# Patient Record
Sex: FEMALE | Race: WHITE
[De-identification: names, ages, dates, MRNs, and addresses within clinical notes are randomized per-mention and may not be internally consistent; named-entity substitution may affect disease eponyms.]

---

## 2017-03-06 ENCOUNTER — CHARTING TRANS (OUTPATIENT)
Dept: OTHER | Age: 27
End: 2017-03-06

## 2018-11-05 VITALS
HEIGHT: 65 IN | RESPIRATION RATE: 16 BRPM | WEIGHT: 170 LBS | HEART RATE: 66 BPM | DIASTOLIC BLOOD PRESSURE: 70 MMHG | OXYGEN SATURATION: 97 % | TEMPERATURE: 98.1 F | SYSTOLIC BLOOD PRESSURE: 120 MMHG | BODY MASS INDEX: 28.32 KG/M2

## 2018-12-18 ENCOUNTER — WALK IN (OUTPATIENT)
Dept: URGENT CARE | Age: 28
End: 2018-12-18

## 2018-12-18 VITALS
HEART RATE: 76 BPM | RESPIRATION RATE: 16 BRPM | WEIGHT: 165 LBS | TEMPERATURE: 98.5 F | DIASTOLIC BLOOD PRESSURE: 70 MMHG | SYSTOLIC BLOOD PRESSURE: 110 MMHG | HEIGHT: 65 IN | BODY MASS INDEX: 27.49 KG/M2 | OXYGEN SATURATION: 97 %

## 2018-12-18 DIAGNOSIS — B96.89 ACUTE BACTERIAL SINUSITIS: Primary | ICD-10-CM

## 2018-12-18 DIAGNOSIS — J01.90 ACUTE BACTERIAL SINUSITIS: Primary | ICD-10-CM

## 2018-12-18 PROBLEM — J06.9 VIRAL UPPER RESPIRATORY TRACT INFECTION: Status: ACTIVE | Noted: 2018-12-18

## 2018-12-18 PROCEDURE — 99214 OFFICE O/P EST MOD 30 MIN: CPT | Performed by: NURSE PRACTITIONER

## 2018-12-18 RX ORDER — BENZONATATE 100 MG/1
100 CAPSULE ORAL 3 TIMES DAILY PRN
Qty: 21 CAPSULE | Refills: 0 | Status: SHIPPED | OUTPATIENT
Start: 2018-12-18 | End: 2018-12-25

## 2018-12-18 RX ORDER — AMOXICILLIN AND CLAVULANATE POTASSIUM 875; 125 MG/1; MG/1
1 TABLET, FILM COATED ORAL 2 TIMES DAILY
Qty: 20 TABLET | Refills: 0 | Status: SHIPPED | OUTPATIENT
Start: 2018-12-18 | End: 2018-12-28

## 2018-12-18 ASSESSMENT — ENCOUNTER SYMPTOMS
SWOLLEN GLANDS: 0
ABDOMINAL PAIN: 0
HEADACHES: 0
HEARTBURN: 0
ANOREXIA: 0
VOMITING: 0
CHANGE IN BOWEL HABIT: 0
CHILLS: 1
SORE THROAT: 0
SHORTNESS OF BREATH: 0
HEMOPTYSIS: 0
VISUAL CHANGE: 0
RHINORRHEA: 1
NAUSEA: 0
WHEEZING: 0
SWEATS: 0
WEAKNESS: 0
NUMBNESS: 0
COUGH: 1
DIAPHORESIS: 0
FATIGUE: 1
FEVER: 0
VERTIGO: 0
WEIGHT LOSS: 0

## 2020-08-27 ENCOUNTER — OFFICE VISIT (OUTPATIENT)
Dept: FAMILY MEDICINE CLINIC | Facility: CLINIC | Age: 30
End: 2020-08-27
Payer: COMMERCIAL

## 2020-08-27 VITALS
DIASTOLIC BLOOD PRESSURE: 80 MMHG | TEMPERATURE: 98 F | HEART RATE: 77 BPM | HEIGHT: 66 IN | WEIGHT: 196 LBS | SYSTOLIC BLOOD PRESSURE: 118 MMHG | BODY MASS INDEX: 31.5 KG/M2

## 2020-08-27 DIAGNOSIS — N92.6 IRREGULAR PERIODS: ICD-10-CM

## 2020-08-27 DIAGNOSIS — L68.0 HIRSUTISM: ICD-10-CM

## 2020-08-27 DIAGNOSIS — H61.23 BILATERAL IMPACTED CERUMEN: ICD-10-CM

## 2020-08-27 DIAGNOSIS — E66.9 OBESITY (BMI 30.0-34.9): ICD-10-CM

## 2020-08-27 DIAGNOSIS — Z00.00 WELL ADULT EXAM: Primary | ICD-10-CM

## 2020-08-27 PROCEDURE — 69210 REMOVE IMPACTED EAR WAX UNI: CPT | Performed by: STUDENT IN AN ORGANIZED HEALTH CARE EDUCATION/TRAINING PROGRAM

## 2020-08-27 PROCEDURE — 3074F SYST BP LT 130 MM HG: CPT | Performed by: STUDENT IN AN ORGANIZED HEALTH CARE EDUCATION/TRAINING PROGRAM

## 2020-08-27 PROCEDURE — 99385 PREV VISIT NEW AGE 18-39: CPT | Performed by: STUDENT IN AN ORGANIZED HEALTH CARE EDUCATION/TRAINING PROGRAM

## 2020-08-27 PROCEDURE — 3079F DIAST BP 80-89 MM HG: CPT | Performed by: STUDENT IN AN ORGANIZED HEALTH CARE EDUCATION/TRAINING PROGRAM

## 2020-08-27 PROCEDURE — 3008F BODY MASS INDEX DOCD: CPT | Performed by: STUDENT IN AN ORGANIZED HEALTH CARE EDUCATION/TRAINING PROGRAM

## 2020-08-27 NOTE — PROGRESS NOTES
HPI:    Patient ID: Mely Kent is a 30year old female. HPI  Pt presenting for routine well woman exam. Denies any acute issues or recent illnesses. No significant chronic medical problems.  Past medical/surgical history, family history, and social h membrane, ear canal and external ear normal. There is impacted cerumen. Eyes:      Extraocular Movements: Extraocular movements intact. Conjunctiva/sclera: Conjunctivae normal.      Pupils: Pupils are equal, round, and reactive to light.    Neck: to increase fiber and water intake as tolerated  - HEMOGLOBIN A1C; Future  - LIPID PANEL; Future    3.  Irregular periods  Clinical symptoms suggestive of PCOS  - will check labs below  - to see Gyne for continued management   - CBC, PLATELET; NO DIFFERENTI

## 2020-08-28 ENCOUNTER — LAB ENCOUNTER (OUTPATIENT)
Dept: LAB | Age: 30
End: 2020-08-28
Attending: STUDENT IN AN ORGANIZED HEALTH CARE EDUCATION/TRAINING PROGRAM
Payer: COMMERCIAL

## 2020-08-28 DIAGNOSIS — Z00.00 WELL ADULT EXAM: ICD-10-CM

## 2020-08-28 DIAGNOSIS — E66.9 OBESITY (BMI 30.0-34.9): ICD-10-CM

## 2020-08-28 DIAGNOSIS — N92.6 IRREGULAR PERIODS: ICD-10-CM

## 2020-08-28 LAB
ALBUMIN SERPL-MCNC: 3.7 G/DL (ref 3.4–5)
ALBUMIN/GLOB SERPL: 1.2 {RATIO} (ref 1–2)
ALP LIVER SERPL-CCNC: 53 U/L (ref 37–98)
ALT SERPL-CCNC: 20 U/L (ref 13–56)
ANION GAP SERPL CALC-SCNC: 5 MMOL/L (ref 0–18)
AST SERPL-CCNC: 15 U/L (ref 15–37)
BILIRUB SERPL-MCNC: 0.6 MG/DL (ref 0.1–2)
BUN BLD-MCNC: 10 MG/DL (ref 7–18)
BUN/CREAT SERPL: 11.2 (ref 10–20)
CALCIUM BLD-MCNC: 9.2 MG/DL (ref 8.5–10.1)
CHLORIDE SERPL-SCNC: 107 MMOL/L (ref 98–112)
CHOLEST SMN-MCNC: 212 MG/DL (ref ?–200)
CO2 SERPL-SCNC: 27 MMOL/L (ref 21–32)
CREAT BLD-MCNC: 0.89 MG/DL (ref 0.55–1.02)
DEPRECATED RDW RBC AUTO: 38 FL (ref 35.1–46.3)
ERYTHROCYTE [DISTWIDTH] IN BLOOD BY AUTOMATED COUNT: 12.4 % (ref 11–15)
EST. AVERAGE GLUCOSE BLD GHB EST-MCNC: 108 MG/DL (ref 68–126)
GLOBULIN PLAS-MCNC: 3.2 G/DL (ref 2.8–4.4)
GLUCOSE BLD-MCNC: 86 MG/DL (ref 70–99)
HBA1C MFR BLD HPLC: 5.4 % (ref ?–5.7)
HCT VFR BLD AUTO: 42.1 % (ref 35–48)
HDLC SERPL-MCNC: 82 MG/DL (ref 40–59)
HGB BLD-MCNC: 13.9 G/DL (ref 12–16)
LDLC SERPL CALC-MCNC: 111 MG/DL (ref ?–100)
M PROTEIN MFR SERPL ELPH: 6.9 G/DL (ref 6.4–8.2)
MCH RBC QN AUTO: 28 PG (ref 26–34)
MCHC RBC AUTO-ENTMCNC: 33 G/DL (ref 31–37)
MCV RBC AUTO: 84.7 FL (ref 80–100)
NONHDLC SERPL-MCNC: 130 MG/DL (ref ?–130)
OSMOLALITY SERPL CALC.SUM OF ELEC: 286 MOSM/KG (ref 275–295)
PATIENT FASTING Y/N/NP: YES
PATIENT FASTING Y/N/NP: YES
PLATELET # BLD AUTO: 266 10(3)UL (ref 150–450)
POTASSIUM SERPL-SCNC: 3.9 MMOL/L (ref 3.5–5.1)
RBC # BLD AUTO: 4.97 X10(6)UL (ref 3.8–5.3)
SODIUM SERPL-SCNC: 139 MMOL/L (ref 136–145)
TRIGL SERPL-MCNC: 96 MG/DL (ref 30–149)
TSI SER-ACNC: 1.08 MIU/ML (ref 0.36–3.74)
VLDLC SERPL CALC-MCNC: 19 MG/DL (ref 0–30)
WBC # BLD AUTO: 6.4 X10(3) UL (ref 4–11)

## 2020-08-28 PROCEDURE — 84443 ASSAY THYROID STIM HORMONE: CPT

## 2020-08-28 PROCEDURE — 83036 HEMOGLOBIN GLYCOSYLATED A1C: CPT

## 2020-08-28 PROCEDURE — 80061 LIPID PANEL: CPT

## 2020-08-28 PROCEDURE — 36415 COLL VENOUS BLD VENIPUNCTURE: CPT

## 2020-08-28 PROCEDURE — 80053 COMPREHEN METABOLIC PANEL: CPT

## 2020-08-28 PROCEDURE — 85027 COMPLETE CBC AUTOMATED: CPT

## 2020-08-31 ENCOUNTER — NURSE TRIAGE (OUTPATIENT)
Dept: FAMILY MEDICINE CLINIC | Facility: CLINIC | Age: 30
End: 2020-08-31

## 2020-08-31 NOTE — TELEPHONE ENCOUNTER
Action Requested: Summary for Provider     []  Critical Lab, Recommendations Needed  [] Need Additional Advice  []   FYI    []   Need Orders  [] Need Medications Sent to Pharmacy  []  Other     SUMMARY: exposed to person who tested positive for COVID-19.

## 2020-09-17 ENCOUNTER — TELEPHONE (OUTPATIENT)
Dept: FAMILY MEDICINE CLINIC | Facility: CLINIC | Age: 30
End: 2020-09-17

## 2020-09-17 DIAGNOSIS — R10.2 PELVIC PAIN: Primary | ICD-10-CM

## 2020-09-17 NOTE — TELEPHONE ENCOUNTER
Patient is calling stating she was supposed to have an ultra sound for her Ovaries. There is not one in the system. Can we please get that ordered? Please Advise.

## 2020-09-17 NOTE — TELEPHONE ENCOUNTER
Please call pt to let her know pelvic ultrasound was ordered. She must call [phone] to schedule. She should also follow-up with Gyne as previously discussed; referral is in chart.

## 2020-09-17 NOTE — TELEPHONE ENCOUNTER
Pelvic ultrasound ordered.  If normal, she will need to follow-up with Gyne (referral placed during last OV)

## 2020-09-17 NOTE — TELEPHONE ENCOUNTER
Does she need to see the gynecologist or will you order the ultrasound of her ovaries as she is requesting

## 2020-10-05 ENCOUNTER — HOSPITAL ENCOUNTER (OUTPATIENT)
Age: 30
Discharge: HOME | End: 2020-10-05
Payer: COMMERCIAL

## 2020-10-05 VITALS
HEIGHT: 65 IN | SYSTOLIC BLOOD PRESSURE: 108 MMHG | WEIGHT: 190 LBS | BODY MASS INDEX: 31.65 KG/M2 | TEMPERATURE: 98 F | OXYGEN SATURATION: 99 % | HEART RATE: 66 BPM | DIASTOLIC BLOOD PRESSURE: 70 MMHG | RESPIRATION RATE: 20 BRPM

## 2020-10-05 DIAGNOSIS — L03.019 ONYCHIA AND PARONYCHIA OF FINGER: Primary | ICD-10-CM

## 2020-10-05 PROCEDURE — 99202 OFFICE O/P NEW SF 15 MIN: CPT | Performed by: NURSE PRACTITIONER

## 2020-10-05 RX ORDER — CEPHALEXIN 500 MG/1
500 CAPSULE ORAL 2 TIMES DAILY
Qty: 14 CAPSULE | Refills: 0 | Status: SHIPPED | OUTPATIENT
Start: 2020-10-05 | End: 2020-10-12

## 2020-10-05 NOTE — ED PROVIDER NOTES
Patient Seen in: [address]      History   Patient presents with:  Finger Injury: Left first digit    Stated Complaint: FINGER INJURY    HPI    This is a well-appearing female who presents with a chief complaint of left Mouth/Throat:      Mouth: Mucous membranes are dry. Eyes:      Extraocular Movements: Extraocular movements intact. Cardiovascular:      Rate and Rhythm: Regular rhythm.    Pulmonary:      Effort: Pulmonary effort is normal.      Breath sounds: Krish Yuen

## 2020-10-08 ENCOUNTER — HOSPITAL ENCOUNTER (OUTPATIENT)
Dept: ULTRASOUND IMAGING | Age: 30
Discharge: HOME | End: 2020-10-08
Attending: STUDENT IN AN ORGANIZED HEALTH CARE EDUCATION/TRAINING PROGRAM
Payer: COMMERCIAL

## 2020-10-08 DIAGNOSIS — R10.2 PELVIC PAIN: ICD-10-CM

## 2020-10-08 PROCEDURE — 76856 US EXAM PELVIC COMPLETE: CPT | Performed by: STUDENT IN AN ORGANIZED HEALTH CARE EDUCATION/TRAINING PROGRAM

## 2020-11-02 ENCOUNTER — OFFICE VISIT (OUTPATIENT)
Dept: OBGYN CLINIC | Facility: CLINIC | Age: 30
End: 2020-11-02
Payer: COMMERCIAL

## 2020-11-02 VITALS
WEIGHT: 192 LBS | BODY MASS INDEX: 30.86 KG/M2 | SYSTOLIC BLOOD PRESSURE: 114 MMHG | DIASTOLIC BLOOD PRESSURE: 70 MMHG | HEIGHT: 66 IN

## 2020-11-02 DIAGNOSIS — N92.6 IRREGULAR MENSES: ICD-10-CM

## 2020-11-02 DIAGNOSIS — Z01.419 ENCOUNTER FOR GYNECOLOGICAL EXAMINATION WITHOUT ABNORMAL FINDING: Primary | ICD-10-CM

## 2020-11-02 PROCEDURE — 87591 N.GONORRHOEAE DNA AMP PROB: CPT | Performed by: OBSTETRICS & GYNECOLOGY

## 2020-11-02 PROCEDURE — 3074F SYST BP LT 130 MM HG: CPT | Performed by: OBSTETRICS & GYNECOLOGY

## 2020-11-02 PROCEDURE — 3008F BODY MASS INDEX DOCD: CPT | Performed by: OBSTETRICS & GYNECOLOGY

## 2020-11-02 PROCEDURE — 99204 OFFICE O/P NEW MOD 45 MIN: CPT | Performed by: OBSTETRICS & GYNECOLOGY

## 2020-11-02 PROCEDURE — 87491 CHLMYD TRACH DNA AMP PROBE: CPT | Performed by: OBSTETRICS & GYNECOLOGY

## 2020-11-02 PROCEDURE — 3078F DIAST BP <80 MM HG: CPT | Performed by: OBSTETRICS & GYNECOLOGY

## 2020-11-02 PROCEDURE — 87624 HPV HI-RISK TYP POOLED RSLT: CPT | Performed by: OBSTETRICS & GYNECOLOGY

## 2020-11-02 PROCEDURE — 99385 PREV VISIT NEW AGE 18-39: CPT | Performed by: OBSTETRICS & GYNECOLOGY

## 2020-11-02 RX ORDER — ETONOGESTREL AND ETHINYL ESTRADIOL 11.7; 2.7 MG/1; MG/1
INSERT, EXTENDED RELEASE VAGINAL
Qty: 3 EACH | Refills: 3 | Status: SHIPPED | OUTPATIENT
Start: 2020-11-02 | End: 2021-09-15

## 2020-11-02 NOTE — PROGRESS NOTES
0GYN H&P     2020  10:06 AM    CC: Patient is here for irregular periods    HPI: Patient is a 26year old  for PCO management & pap smear. Patient with long h/o skipped periods.  She has not have a period since 2020 and had blood work and Live Births   0 0 0 0 0       GYN hx:    Hx Prior Abnormal Pap: Yes  Pap Date: 03/19/20  Pap Result Notes: +HPV  Follow Up Recommendation: Colposcopy    Hx of abnormal paps 1.5 years ago she had abnormal pap and colpo      Past Medical History:   Diagnosis masses. Liver and spleen non-tender, no enlargement. No palpable hernias  GYNE/:  External Genitalia: Normal appearing, no lesions, normal hair distribution   Urethral meatus appear wnl, no abnormal discharge or lesions noted.    Bladder: well supported, mOsm/kg  286   eGFR NON-AFR.  AMERICAN      >=60  87   eGFR       >=60  100   ALT (SGPT)      13 - 56 U/L  20   AST (SGOT)      15 - 37 U/L  15   ALKALINE PHOSPHATASE      37 - 98 U/L  53   Total Bilirubin      0.1 - 2.0 mg/dL  0.6   TOTAL P daily with meals. Dispense: 120 tablet; Refill: 5  - Etonogestrel-Ethinyl Estradiol (NUVARING) 0.12-0.015 MG/24HR Vaginal Ring; 1 per vagina for 3 weeks and 1 week out  Dispense: 3 each; Refill: 3      Pap gc/chl  I dw pt dx of PCO and treatment.  Written

## 2021-09-12 DIAGNOSIS — N92.6 IRREGULAR MENSES: ICD-10-CM

## 2021-09-15 DIAGNOSIS — N92.6 IRREGULAR MENSES: ICD-10-CM

## 2021-09-15 RX ORDER — ETONOGESTREL AND ETHINYL ESTRADIOL 11.7; 2.7 MG/1; MG/1
INSERT, EXTENDED RELEASE VAGINAL
Qty: 3 EACH | Refills: 3 | OUTPATIENT
Start: 2021-09-15

## 2021-09-15 RX ORDER — ETONOGESTREL AND ETHINYL ESTRADIOL .12; .015 MG/D; MG/D
RING VAGINAL
Qty: 3 RING | Refills: 0 | Status: SHIPPED | OUTPATIENT
Start: 2021-09-15 | End: 2021-11-18

## 2021-09-15 NOTE — TELEPHONE ENCOUNTER
A refill request was received for:  Requested Prescriptions             Pending Prescriptions Disp Refills   • ELURYNG 0.12-0.015 MG/24HR Vaginal Ring [Pharmacy Med Name: Eugene Perez 3 ring 0       Sig: PLACE 1 RING VAGINALLY FOR 3 WEEKS AND 1 WEEK OUT      L

## 2021-09-15 NOTE — TELEPHONE ENCOUNTER
A refill request was received for:  Requested Prescriptions     Pending Prescriptions Disp Refills   • ELURYNG 0.12-0.015 MG/24HR Vaginal Ring [Pharmacy Med Name: Mckenzie Dykes 3 ring 0     Sig: PLACE 1 RING VAGINALLY FOR 3 WEEKS AND 1 WEEK OUT     Last refill

## 2021-09-27 ENCOUNTER — LAB ENCOUNTER (OUTPATIENT)
Dept: LAB | Age: 31
End: 2021-09-27
Attending: STUDENT IN AN ORGANIZED HEALTH CARE EDUCATION/TRAINING PROGRAM
Payer: COMMERCIAL

## 2021-09-27 ENCOUNTER — OFFICE VISIT (OUTPATIENT)
Dept: FAMILY MEDICINE CLINIC | Facility: CLINIC | Age: 31
End: 2021-09-27
Payer: COMMERCIAL

## 2021-09-27 VITALS
HEIGHT: 66 IN | TEMPERATURE: 98 F | BODY MASS INDEX: 31.66 KG/M2 | DIASTOLIC BLOOD PRESSURE: 82 MMHG | HEART RATE: 67 BPM | SYSTOLIC BLOOD PRESSURE: 115 MMHG | WEIGHT: 197 LBS

## 2021-09-27 DIAGNOSIS — L98.9 SKIN LESION: ICD-10-CM

## 2021-09-27 DIAGNOSIS — M25.561 RIGHT KNEE PAIN, UNSPECIFIED CHRONICITY: ICD-10-CM

## 2021-09-27 DIAGNOSIS — Z00.00 WELL ADULT EXAM: ICD-10-CM

## 2021-09-27 DIAGNOSIS — Z00.00 WELL ADULT EXAM: Primary | ICD-10-CM

## 2021-09-27 PROCEDURE — 85027 COMPLETE CBC AUTOMATED: CPT

## 2021-09-27 PROCEDURE — 82306 VITAMIN D 25 HYDROXY: CPT

## 2021-09-27 PROCEDURE — 3074F SYST BP LT 130 MM HG: CPT | Performed by: STUDENT IN AN ORGANIZED HEALTH CARE EDUCATION/TRAINING PROGRAM

## 2021-09-27 PROCEDURE — 99395 PREV VISIT EST AGE 18-39: CPT | Performed by: STUDENT IN AN ORGANIZED HEALTH CARE EDUCATION/TRAINING PROGRAM

## 2021-09-27 PROCEDURE — 3079F DIAST BP 80-89 MM HG: CPT | Performed by: STUDENT IN AN ORGANIZED HEALTH CARE EDUCATION/TRAINING PROGRAM

## 2021-09-27 PROCEDURE — 3008F BODY MASS INDEX DOCD: CPT | Performed by: STUDENT IN AN ORGANIZED HEALTH CARE EDUCATION/TRAINING PROGRAM

## 2021-09-27 PROCEDURE — 80061 LIPID PANEL: CPT

## 2021-09-27 PROCEDURE — 83036 HEMOGLOBIN GLYCOSYLATED A1C: CPT

## 2021-09-27 PROCEDURE — 80053 COMPREHEN METABOLIC PANEL: CPT

## 2021-09-27 PROCEDURE — 84443 ASSAY THYROID STIM HORMONE: CPT

## 2021-09-27 PROCEDURE — 36415 COLL VENOUS BLD VENIPUNCTURE: CPT

## 2021-09-27 NOTE — PROGRESS NOTES
HPI:    Patient ID: Colon Fetch is a 33year old female. HPI  Pt presenting for routine physical exam. Denies any recent illnesses. No significant chronic medical problems.  Past medical/surgical history, family history, and social history were review Allergies    09/27/21  1302   BP: 115/82   Pulse: 67   Temp: 98 °F (36.7 °C)   TempSrc: Temporal   Weight: 197 lb (89.4 kg)   Height: 5' 6\" (1.676 m)       Body mass index is 31.8 kg/m². PHYSICAL EXAM:   Physical Exam  Vitals reviewed.    Constitutional: Mental status is at baseline. Psychiatric:         Attention and Perception: Attention normal.         Mood and Affect: Mood normal.         Behavior: Behavior normal. Behavior is cooperative.          Cognition and Memory: Cognition normal.             A

## 2021-11-18 ENCOUNTER — OFFICE VISIT (OUTPATIENT)
Dept: OBGYN CLINIC | Facility: CLINIC | Age: 31
End: 2021-11-18
Payer: COMMERCIAL

## 2021-11-18 VITALS
SYSTOLIC BLOOD PRESSURE: 104 MMHG | WEIGHT: 197 LBS | HEIGHT: 66 IN | DIASTOLIC BLOOD PRESSURE: 78 MMHG | BODY MASS INDEX: 31.66 KG/M2

## 2021-11-18 DIAGNOSIS — Z01.419 ENCOUNTER FOR GYNECOLOGICAL EXAMINATION WITHOUT ABNORMAL FINDING: Primary | ICD-10-CM

## 2021-11-18 DIAGNOSIS — E66.9 OBESITY (BMI 30-39.9): ICD-10-CM

## 2021-11-18 DIAGNOSIS — N92.6 IRREGULAR MENSES: ICD-10-CM

## 2021-11-18 PROCEDURE — 99395 PREV VISIT EST AGE 18-39: CPT | Performed by: OBSTETRICS & GYNECOLOGY

## 2021-11-18 PROCEDURE — 3008F BODY MASS INDEX DOCD: CPT | Performed by: OBSTETRICS & GYNECOLOGY

## 2021-11-18 PROCEDURE — 87624 HPV HI-RISK TYP POOLED RSLT: CPT | Performed by: OBSTETRICS & GYNECOLOGY

## 2021-11-18 PROCEDURE — 3074F SYST BP LT 130 MM HG: CPT | Performed by: OBSTETRICS & GYNECOLOGY

## 2021-11-18 PROCEDURE — 3078F DIAST BP <80 MM HG: CPT | Performed by: OBSTETRICS & GYNECOLOGY

## 2021-11-18 RX ORDER — PYRIDOXINE HCL (VITAMIN B6) 100 MG
TABLET ORAL
COMMUNITY

## 2021-11-18 RX ORDER — ZINC SULFATE 50(220)MG
220 CAPSULE ORAL DAILY
COMMUNITY

## 2021-11-18 RX ORDER — ETONOGESTREL AND ETHINYL ESTRADIOL 11.7; 2.7 MG/1; MG/1
INSERT, EXTENDED RELEASE VAGINAL
Qty: 3 RING | Refills: 4 | Status: SHIPPED | OUTPATIENT
Start: 2021-11-18

## 2021-11-18 NOTE — PROGRESS NOTES
GYN H&P     2021  12:40 PM    CC: Patient is here for annual.     HPI: Patient is a 33year old  for annual. She needs nuvaring and metformin refilled. She has not lost weight, though.  She has been trying to use different vitamins to help lose Male        Birth control/protection: Condom, Pill    Social History    Social History Narrative      Live with boyfriend      Medications reviewed.  See active list.     /78   Ht 66\"   Wt 197 lb (89.4 kg)   LMP 10/07/2021   Breastfeeding No   BMI 31

## 2022-03-07 ENCOUNTER — OFFICE VISIT (OUTPATIENT)
Dept: DERMATOLOGY CLINIC | Facility: CLINIC | Age: 32
End: 2022-03-07
Payer: COMMERCIAL

## 2022-03-07 DIAGNOSIS — D23.5 BENIGN NEOPLASM OF SKIN OF TRUNK, EXCEPT SCROTUM: ICD-10-CM

## 2022-03-07 DIAGNOSIS — L81.2 EPHELIDES: ICD-10-CM

## 2022-03-07 DIAGNOSIS — D22.9 MULTIPLE NEVI: Primary | ICD-10-CM

## 2022-03-07 DIAGNOSIS — D23.30 BENIGN NEOPLASM OF SKIN OF FACE: ICD-10-CM

## 2022-03-07 DIAGNOSIS — D23.60 BENIGN NEOPLASM OF SKIN OF UPPER LIMB, INCLUDING SHOULDER, UNSPECIFIED LATERALITY: ICD-10-CM

## 2022-03-07 DIAGNOSIS — D23.4 BENIGN NEOPLASM OF SCALP AND SKIN OF NECK: ICD-10-CM

## 2022-03-07 PROCEDURE — 99203 OFFICE O/P NEW LOW 30 MIN: CPT | Performed by: DERMATOLOGY

## 2022-04-13 DIAGNOSIS — E55.9 VITAMIN D DEFICIENCY: ICD-10-CM

## 2022-04-14 RX ORDER — ERGOCALCIFEROL 1.25 MG/1
50000 CAPSULE ORAL WEEKLY
Qty: 24 CAPSULE | Refills: 0 | Status: SHIPPED | OUTPATIENT
Start: 2022-04-14 | End: 2023-01-04

## 2022-04-14 NOTE — TELEPHONE ENCOUNTER
Dr Tani Mckinney below, do you want to order a repeat Vit D level first ? Pended order and medication for approval. Thanks . Lab note 2021; Your vitamin D level is low, indicating a Vitamin D deficiency. Let's start a weekly vitamin D supplement for the next 24 weeks, and plan to recheck your level at that time. I've sent the medication to your pharmacy. Please call with any questions. Written by John Madison MD on 10/9/2021  2:43 AM CDT        Please review; protocol failed/no protocol.      Requested Prescriptions   Pending Prescriptions Disp Refills    ERGOCALCIFEROL 1.25 MG (18157 UT) Oral Cap [Pharmacy Med Name: VITAMIN D2 50,000IU (ERGO) CAP RX] 24 capsule 0     Sig: TAKE 1 CAPSULE BY MOUTH EVERY WEEK        There is no refill protocol information for this order            Recent Outpatient Visits              1 month ago Multiple nevi    [address] [facility] Wendy Gamble MD    Office Visit    4 months ago Encounter for gynecological examination without abnormal finding    Nithya Alvarado MD    Office Visit    6 months ago Well adult exam    Malou Wilkins MD    Office Visit    1 year ago Encounter for gynecological examination without abnormal finding    Nithya Alvarado MD    Office Visit    1 year ago Well adult exam    Celestine Singh MD    Office Visit

## 2023-01-04 DIAGNOSIS — E55.9 VITAMIN D DEFICIENCY: ICD-10-CM

## 2023-01-05 RX ORDER — ERGOCALCIFEROL 1.25 MG/1
50000 CAPSULE ORAL WEEKLY
Qty: 24 CAPSULE | Refills: 0 | Status: SHIPPED | OUTPATIENT
Start: 2023-01-05

## 2023-01-05 NOTE — TELEPHONE ENCOUNTER
Please review refill protocol failed/ no protocol  Requested Prescriptions   Pending Prescriptions Disp Refills    ergocalciferol 1.25 MG (17160 UT) Oral Cap 24 capsule 0     Sig: Take 1 capsule (50,000 Units total) by mouth once a week.        There is no refill protocol information for this order

## 2023-05-04 ENCOUNTER — TELEPHONE (OUTPATIENT)
Dept: OBGYN CLINIC | Facility: CLINIC | Age: 33
End: 2023-05-04

## 2023-05-04 DIAGNOSIS — N92.6 IRREGULAR MENSES: Primary | ICD-10-CM

## 2023-05-04 RX ORDER — ETONOGESTREL AND ETHINYL ESTRADIOL .12; .015 MG/D; MG/D
RING VAGINAL
Refills: 0 | OUTPATIENT
Start: 2023-05-04

## 2023-05-08 RX ORDER — ETONOGESTREL AND ETHINYL ESTRADIOL 11.7; 2.7 MG/1; MG/1
1 INSERT, EXTENDED RELEASE VAGINAL
Qty: 1 RING | Refills: 0 | Status: SHIPPED | OUTPATIENT
Start: 2023-05-08

## 2023-05-08 NOTE — TELEPHONE ENCOUNTER
RN spoke to pt and told her that 1-mos refill was sent, but since she has not been since since 11/2021, she will need to make an appt to get additional refills. Pt will check her work sked and call back to make an appt. RN provided pt with office number. Pt verbalized understanding and agreed with POC.

## 2023-05-08 NOTE — TELEPHONE ENCOUNTER
Pt calling in to get a refill on the following. Etonogestrel-Ethinyl Estradiol (ELURYNG) 0.12-0.015 MG/24HR Vaginal Ring  Pt states she will only need 2 more as she is getting  soon.      [id number] - [place] [address] AT [address] [place] [address], [phone], [phone]

## 2023-05-23 ENCOUNTER — OFFICE VISIT (OUTPATIENT)
Dept: OBGYN CLINIC | Facility: CLINIC | Age: 33
End: 2023-05-23
Payer: COMMERCIAL

## 2023-05-23 VITALS
WEIGHT: 183 LBS | DIASTOLIC BLOOD PRESSURE: 70 MMHG | SYSTOLIC BLOOD PRESSURE: 114 MMHG | HEIGHT: 66 IN | BODY MASS INDEX: 29.41 KG/M2

## 2023-05-23 DIAGNOSIS — N92.6 IRREGULAR MENSES: ICD-10-CM

## 2023-05-23 DIAGNOSIS — Z01.419 ENCOUNTER FOR GYNECOLOGICAL EXAMINATION WITHOUT ABNORMAL FINDING: Primary | ICD-10-CM

## 2023-05-23 PROCEDURE — 87624 HPV HI-RISK TYP POOLED RSLT: CPT | Performed by: OBSTETRICS & GYNECOLOGY

## 2023-05-23 PROCEDURE — 3078F DIAST BP <80 MM HG: CPT | Performed by: OBSTETRICS & GYNECOLOGY

## 2023-05-23 PROCEDURE — 99395 PREV VISIT EST AGE 18-39: CPT | Performed by: OBSTETRICS & GYNECOLOGY

## 2023-05-23 PROCEDURE — 3074F SYST BP LT 130 MM HG: CPT | Performed by: OBSTETRICS & GYNECOLOGY

## 2023-05-23 PROCEDURE — 3008F BODY MASS INDEX DOCD: CPT | Performed by: OBSTETRICS & GYNECOLOGY

## 2023-05-23 RX ORDER — ETONOGESTREL AND ETHINYL ESTRADIOL 11.7; 2.7 MG/1; MG/1
1 INSERT, EXTENDED RELEASE VAGINAL
Qty: 3 RING | Refills: 3 | Status: SHIPPED | OUTPATIENT
Start: 2023-05-23

## 2023-05-24 LAB — HPV I/H RISK 1 DNA SPEC QL NAA+PROBE: NEGATIVE

## 2023-09-06 ENCOUNTER — OFFICE VISIT (OUTPATIENT)
Dept: FAMILY MEDICINE CLINIC | Facility: CLINIC | Age: 33
End: 2023-09-06

## 2023-09-06 ENCOUNTER — LAB ENCOUNTER (OUTPATIENT)
Dept: LAB | Age: 33
End: 2023-09-06
Attending: STUDENT IN AN ORGANIZED HEALTH CARE EDUCATION/TRAINING PROGRAM
Payer: COMMERCIAL

## 2023-09-06 VITALS
DIASTOLIC BLOOD PRESSURE: 77 MMHG | SYSTOLIC BLOOD PRESSURE: 112 MMHG | BODY MASS INDEX: 30.89 KG/M2 | HEIGHT: 66 IN | OXYGEN SATURATION: 97 % | RESPIRATION RATE: 14 BRPM | HEART RATE: 71 BPM | WEIGHT: 192.19 LBS

## 2023-09-06 DIAGNOSIS — E28.2 PCOS (POLYCYSTIC OVARIAN SYNDROME): Primary | ICD-10-CM

## 2023-09-06 DIAGNOSIS — Z31.9 PATIENT DESIRES PREGNANCY: ICD-10-CM

## 2023-09-06 DIAGNOSIS — R41.840 DECREASED ATTENTION SPAN: ICD-10-CM

## 2023-09-06 LAB
ALBUMIN SERPL-MCNC: 4.1 G/DL (ref 3.4–5)
ALBUMIN/GLOB SERPL: 1.2 {RATIO} (ref 1–2)
ALP LIVER SERPL-CCNC: 48 U/L
ALT SERPL-CCNC: 36 U/L
ANION GAP SERPL CALC-SCNC: 6 MMOL/L (ref 0–18)
AST SERPL-CCNC: 24 U/L (ref 15–37)
BILIRUB SERPL-MCNC: 0.4 MG/DL (ref 0.1–2)
BUN BLD-MCNC: 13 MG/DL (ref 7–18)
BUN/CREAT SERPL: 14.9 (ref 10–20)
CALCIUM BLD-MCNC: 9.5 MG/DL (ref 8.5–10.1)
CHLORIDE SERPL-SCNC: 106 MMOL/L (ref 98–112)
CHOLEST SERPL-MCNC: 216 MG/DL (ref ?–200)
CO2 SERPL-SCNC: 28 MMOL/L (ref 21–32)
CREAT BLD-MCNC: 0.87 MG/DL
DEPRECATED RDW RBC AUTO: 37 FL (ref 35.1–46.3)
EGFRCR SERPLBLD CKD-EPI 2021: 90 ML/MIN/1.73M2 (ref 60–?)
ERYTHROCYTE [DISTWIDTH] IN BLOOD BY AUTOMATED COUNT: 12.2 % (ref 11–15)
EST. AVERAGE GLUCOSE BLD GHB EST-MCNC: 114 MG/DL (ref 68–126)
FASTING PATIENT LIPID ANSWER: NO
FASTING STATUS PATIENT QL REPORTED: NO
GLOBULIN PLAS-MCNC: 3.4 G/DL (ref 2.8–4.4)
GLUCOSE BLD-MCNC: 87 MG/DL (ref 70–99)
HBA1C MFR BLD: 5.6 % (ref ?–5.7)
HCT VFR BLD AUTO: 42.4 %
HDLC SERPL-MCNC: 95 MG/DL (ref 40–59)
HGB BLD-MCNC: 13.9 G/DL
LDLC SERPL CALC-MCNC: 109 MG/DL (ref ?–100)
MCH RBC QN AUTO: 27.7 PG (ref 26–34)
MCHC RBC AUTO-ENTMCNC: 32.8 G/DL (ref 31–37)
MCV RBC AUTO: 84.6 FL
NONHDLC SERPL-MCNC: 121 MG/DL (ref ?–130)
OSMOLALITY SERPL CALC.SUM OF ELEC: 289 MOSM/KG (ref 275–295)
PLATELET # BLD AUTO: 272 10(3)UL (ref 150–450)
POTASSIUM SERPL-SCNC: 4.1 MMOL/L (ref 3.5–5.1)
PROT SERPL-MCNC: 7.5 G/DL (ref 6.4–8.2)
RBC # BLD AUTO: 5.01 X10(6)UL
SODIUM SERPL-SCNC: 140 MMOL/L (ref 136–145)
TRIGL SERPL-MCNC: 70 MG/DL (ref 30–149)
TSI SER-ACNC: 0.53 MIU/ML (ref 0.36–3.74)
VLDLC SERPL CALC-MCNC: 12 MG/DL (ref 0–30)
WBC # BLD AUTO: 7.2 X10(3) UL (ref 4–11)

## 2023-09-06 PROCEDURE — 3008F BODY MASS INDEX DOCD: CPT | Performed by: STUDENT IN AN ORGANIZED HEALTH CARE EDUCATION/TRAINING PROGRAM

## 2023-09-06 PROCEDURE — 80061 LIPID PANEL: CPT

## 2023-09-06 PROCEDURE — 99213 OFFICE O/P EST LOW 20 MIN: CPT | Performed by: STUDENT IN AN ORGANIZED HEALTH CARE EDUCATION/TRAINING PROGRAM

## 2023-09-06 PROCEDURE — 3074F SYST BP LT 130 MM HG: CPT | Performed by: STUDENT IN AN ORGANIZED HEALTH CARE EDUCATION/TRAINING PROGRAM

## 2023-09-06 PROCEDURE — 85027 COMPLETE CBC AUTOMATED: CPT

## 2023-09-06 PROCEDURE — 84443 ASSAY THYROID STIM HORMONE: CPT

## 2023-09-06 PROCEDURE — 36415 COLL VENOUS BLD VENIPUNCTURE: CPT

## 2023-09-06 PROCEDURE — 80053 COMPREHEN METABOLIC PANEL: CPT

## 2023-09-06 PROCEDURE — 3078F DIAST BP <80 MM HG: CPT | Performed by: STUDENT IN AN ORGANIZED HEALTH CARE EDUCATION/TRAINING PROGRAM

## 2023-09-06 PROCEDURE — 83036 HEMOGLOBIN GLYCOSYLATED A1C: CPT

## 2023-10-10 ENCOUNTER — TELEPHONE (OUTPATIENT)
Dept: FAMILY MEDICINE CLINIC | Facility: CLINIC | Age: 33
End: 2023-10-10

## 2023-10-10 NOTE — TELEPHONE ENCOUNTER
for Yvonne Lopez pt stated that she has a ultrasound scheduled for today at 3:30 that Dr. Johanny Lanza order for her. Pt just found out she is pregnant. Pt will like to know if she still should get this ultrasound done or should she cancel it.  Please advise      Reason for the ultrasound was:  Reason for Exam  Priority: Routine  Dx: PCOS (polycystic ovarian syndrome) [E28.2 (ICD-10-CM)]         Future Appointments   Date Time Provider Betito Hardy   10/10/2023  3:30  [facility] [address] [facility]

## 2023-10-10 NOTE — TELEPHONE ENCOUNTER
If patient is pregnant and just found out. Can postpone or hold off on pelvic ultrasound unless she is having sig symptoms. Can reschedule if needed at future date. Chart reviewed and looks like this was just for surveillance of her polycystic ovaries. Can follow up with DR Raegan Mcrae for further recommendations.

## 2023-10-16 ENCOUNTER — PATIENT MESSAGE (OUTPATIENT)
Dept: OBGYN CLINIC | Facility: CLINIC | Age: 33
End: 2023-10-16

## 2023-10-16 NOTE — TELEPHONE ENCOUNTER
From: Blaire De  To: Sangeetha Garcia  Sent: 10/16/2023 9:33 AM CDT  Subject: Pregnant     Hello,  I found out I am pregnant on Tuesday the 10th. I am either 9 weeks pregnant or 5 weeks. I have an appointment wirh you Nov 7th. Should I be coming in a early than that?   Thank you  Huy Mason

## 2023-10-18 ENCOUNTER — OFFICE VISIT (OUTPATIENT)
Dept: OBGYN CLINIC | Facility: CLINIC | Age: 33
End: 2023-10-18
Payer: COMMERCIAL

## 2023-10-18 ENCOUNTER — LAB ENCOUNTER (OUTPATIENT)
Dept: LAB | Facility: REFERENCE LAB | Age: 33
End: 2023-10-18
Attending: OBSTETRICS & GYNECOLOGY
Payer: COMMERCIAL

## 2023-10-18 VITALS — SYSTOLIC BLOOD PRESSURE: 110 MMHG | HEIGHT: 66 IN | BODY MASS INDEX: 31 KG/M2 | DIASTOLIC BLOOD PRESSURE: 68 MMHG

## 2023-10-18 DIAGNOSIS — Z34.90 EARLY STAGE OF PREGNANCY: ICD-10-CM

## 2023-10-18 DIAGNOSIS — N92.6 MISSED MENSES: Primary | ICD-10-CM

## 2023-10-18 DIAGNOSIS — N92.6 MISSED MENSES: ICD-10-CM

## 2023-10-18 LAB
B-HCG SERPL-ACNC: ABNORMAL MIU/ML
CONTROL LINE PRESENT WITH A CLEAR BACKGROUND (YES/NO): YES YES/NO
KIT LOT #: NORMAL NUMERIC
PREGNANCY TEST, URINE: POSITIVE
PROGEST SERPL-MCNC: 9.67 NG/ML

## 2023-10-18 PROCEDURE — 84702 CHORIONIC GONADOTROPIN TEST: CPT | Performed by: OBSTETRICS & GYNECOLOGY

## 2023-10-18 PROCEDURE — 3074F SYST BP LT 130 MM HG: CPT | Performed by: OBSTETRICS & GYNECOLOGY

## 2023-10-18 PROCEDURE — 3078F DIAST BP <80 MM HG: CPT | Performed by: OBSTETRICS & GYNECOLOGY

## 2023-10-18 PROCEDURE — 81025 URINE PREGNANCY TEST: CPT | Performed by: OBSTETRICS & GYNECOLOGY

## 2023-10-18 PROCEDURE — 84144 ASSAY OF PROGESTERONE: CPT | Performed by: OBSTETRICS & GYNECOLOGY

## 2023-10-18 PROCEDURE — 99213 OFFICE O/P EST LOW 20 MIN: CPT | Performed by: OBSTETRICS & GYNECOLOGY

## 2023-10-18 RX ORDER — GARLIC EXTRACT 500 MG
1 CAPSULE ORAL DAILY
COMMUNITY

## 2023-10-19 ENCOUNTER — PATIENT MESSAGE (OUTPATIENT)
Dept: OBGYN CLINIC | Facility: CLINIC | Age: 33
End: 2023-10-19

## 2023-10-19 RX ORDER — PROGESTERONE 200 MG/1
200 CAPSULE ORAL AS DIRECTED
Qty: 20 CAPSULE | Refills: 0 | Status: SHIPPED | OUTPATIENT
Start: 2023-10-19 | End: 2023-10-20

## 2023-10-20 ENCOUNTER — PATIENT MESSAGE (OUTPATIENT)
Dept: OBGYN CLINIC | Facility: CLINIC | Age: 33
End: 2023-10-20

## 2023-10-20 DIAGNOSIS — Z34.01 ENCOUNTER FOR SUPERVISION OF NORMAL FIRST PREGNANCY IN FIRST TRIMESTER: ICD-10-CM

## 2023-10-20 DIAGNOSIS — Z34.90 EARLY STAGE OF PREGNANCY: Primary | ICD-10-CM

## 2023-10-20 RX ORDER — PROGESTERONE 200 MG/1
200 CAPSULE ORAL AS DIRECTED
Qty: 20 CAPSULE | Refills: 0 | Status: SHIPPED | OUTPATIENT
Start: 2023-10-20

## 2023-10-20 NOTE — TELEPHONE ENCOUNTER
From: Goldy Das  To: Kathy Henriquez  Sent: 10/19/2023 10:41 PM CDT  Subject: My medication     Hello Dr. Thomas Monday,  I went to  my medications from [facility] ans they told me that they would not be able to give it to me because it was not filled out completely. Is it also possible to send it to a different [facility] for tomorrow. We are heading out of town this weekend and I won't be able to head back to get it. The [facility] location would be [facility]. [address] [place] [address].   Thank you again for everything   Mia

## 2023-10-26 ENCOUNTER — LAB ENCOUNTER (OUTPATIENT)
Dept: LAB | Facility: REFERENCE LAB | Age: 33
End: 2023-10-26
Attending: OBSTETRICS & GYNECOLOGY

## 2023-10-26 ENCOUNTER — NURSE ONLY (OUTPATIENT)
Dept: OBGYN CLINIC | Facility: CLINIC | Age: 33
End: 2023-10-26

## 2023-10-26 DIAGNOSIS — Z34.01 ENCOUNTER FOR SUPERVISION OF NORMAL FIRST PREGNANCY IN FIRST TRIMESTER: ICD-10-CM

## 2023-10-26 DIAGNOSIS — Z34.01 ENCOUNTER FOR SUPERVISION OF NORMAL FIRST PREGNANCY IN FIRST TRIMESTER: Primary | ICD-10-CM

## 2023-10-26 LAB
ANTIBODY SCREEN: NEGATIVE
BASOPHILS # BLD AUTO: 0.11 X10(3) UL (ref 0–0.2)
BASOPHILS NFR BLD AUTO: 1.1 %
DEPRECATED RDW RBC AUTO: 37.3 FL (ref 35.1–46.3)
EOSINOPHIL # BLD AUTO: 0.35 X10(3) UL (ref 0–0.7)
EOSINOPHIL NFR BLD AUTO: 3.4 %
ERYTHROCYTE [DISTWIDTH] IN BLOOD BY AUTOMATED COUNT: 12.1 % (ref 11–15)
GLUCOSE 1H P GLC SERPL-MCNC: 60 MG/DL
HBV SURFACE AG SER-ACNC: <0.1 [IU]/L
HBV SURFACE AG SERPL QL IA: NONREACTIVE
HCT VFR BLD AUTO: 41.8 %
HCV AB SERPL QL IA: NONREACTIVE
HGB BLD-MCNC: 13.7 G/DL
IMM GRANULOCYTES # BLD AUTO: 0.06 X10(3) UL (ref 0–1)
IMM GRANULOCYTES NFR BLD: 0.6 %
LYMPHOCYTES # BLD AUTO: 2.3 X10(3) UL (ref 1–4)
LYMPHOCYTES NFR BLD AUTO: 22.5 %
MCH RBC QN AUTO: 27.8 PG (ref 26–34)
MCHC RBC AUTO-ENTMCNC: 32.8 G/DL (ref 31–37)
MCV RBC AUTO: 84.8 FL
MONOCYTES # BLD AUTO: 0.52 X10(3) UL (ref 0.1–1)
MONOCYTES NFR BLD AUTO: 5.1 %
NEUTROPHILS # BLD AUTO: 6.87 X10 (3) UL (ref 1.5–7.7)
NEUTROPHILS # BLD AUTO: 6.87 X10(3) UL (ref 1.5–7.7)
NEUTROPHILS NFR BLD AUTO: 67.3 %
PLATELET # BLD AUTO: 304 10(3)UL (ref 150–450)
RBC # BLD AUTO: 4.93 X10(6)UL
RH BLOOD TYPE: POSITIVE
RUBV IGG SER QL: POSITIVE
RUBV IGG SER-ACNC: 348.6 IU/ML (ref 10–?)
WBC # BLD AUTO: 10.2 X10(3) UL (ref 4–11)

## 2023-10-26 PROCEDURE — 83021 HEMOGLOBIN CHROMOTOGRAPHY: CPT | Performed by: OBSTETRICS & GYNECOLOGY

## 2023-10-26 PROCEDURE — 82950 GLUCOSE TEST: CPT | Performed by: OBSTETRICS & GYNECOLOGY

## 2023-10-26 PROCEDURE — 86901 BLOOD TYPING SEROLOGIC RH(D): CPT | Performed by: OBSTETRICS & GYNECOLOGY

## 2023-10-26 PROCEDURE — 86900 BLOOD TYPING SEROLOGIC ABO: CPT | Performed by: OBSTETRICS & GYNECOLOGY

## 2023-10-26 PROCEDURE — 86850 RBC ANTIBODY SCREEN: CPT | Performed by: OBSTETRICS & GYNECOLOGY

## 2023-10-26 PROCEDURE — 86780 TREPONEMA PALLIDUM: CPT | Performed by: OBSTETRICS & GYNECOLOGY

## 2023-10-26 PROCEDURE — 86803 HEPATITIS C AB TEST: CPT | Performed by: OBSTETRICS & GYNECOLOGY

## 2023-10-26 PROCEDURE — 87340 HEPATITIS B SURFACE AG IA: CPT | Performed by: OBSTETRICS & GYNECOLOGY

## 2023-10-26 PROCEDURE — 87389 HIV-1 AG W/HIV-1&-2 AB AG IA: CPT | Performed by: OBSTETRICS & GYNECOLOGY

## 2023-10-26 PROCEDURE — 87086 URINE CULTURE/COLONY COUNT: CPT | Performed by: OBSTETRICS & GYNECOLOGY

## 2023-10-26 PROCEDURE — 83020 HEMOGLOBIN ELECTROPHORESIS: CPT | Performed by: OBSTETRICS & GYNECOLOGY

## 2023-10-26 PROCEDURE — 85025 COMPLETE CBC W/AUTO DIFF WBC: CPT | Performed by: OBSTETRICS & GYNECOLOGY

## 2023-10-26 PROCEDURE — 86762 RUBELLA ANTIBODY: CPT | Performed by: OBSTETRICS & GYNECOLOGY

## 2023-10-26 NOTE — PROGRESS NOTES
Pt is a   here today for [employer]. Pregnancy Confirmation apt with: 10/18 with RD    LMP:     US: 10/27    Working AGGIE: 24 (by LMP; [address] not until 10/27)    Pre  BMI: 28.74    Medical Hx significant for: PCOS      Obstetrical Hx significant for: NA    Surgical Hx significant for: NA    EPDS score: 3    Early GTT screening: meets criteria, GTT ordered. Preeclampsia prevention screening: does not meet criteria. OUD Screening: Patient has answered NO to 5p questions and has no  risk factors. Patient given \"What Pregnant Women Need to Know\" handout. Educational material reviewed with patient: Prenatal care, nutrition, weight gain recommendations, travel, exercise, intercourse, pregnancy changes, safe medications, pregnancy and work, fetal movement, labor and  labor, warning signs, food safety, tdap, cord blood, breastfeeding, circumcision, and Group B strep. Pt agrees to blood transfusion if needed: Yes    PN labs ordered: Yes     Optional genetic screening discussed. Pt desires foresight and prequel. [facility] Media Policy: Reviewed and verbalized understanding.      NOB appt: TBD (pt to call)    Lab appt: 10/26

## 2023-10-27 ENCOUNTER — PATIENT MESSAGE (OUTPATIENT)
Dept: OBGYN CLINIC | Facility: CLINIC | Age: 33
End: 2023-10-27

## 2023-10-27 ENCOUNTER — ULTRASOUND ENCOUNTER (OUTPATIENT)
Dept: OBGYN CLINIC | Facility: CLINIC | Age: 33
End: 2023-10-27

## 2023-10-27 DIAGNOSIS — Z34.01 ENCOUNTER FOR SUPERVISION OF NORMAL FIRST PREGNANCY IN FIRST TRIMESTER: ICD-10-CM

## 2023-10-27 LAB — T PALLIDUM AB SER QL: NEGATIVE

## 2023-11-01 ENCOUNTER — PATIENT MESSAGE (OUTPATIENT)
Dept: OBGYN CLINIC | Facility: CLINIC | Age: 33
End: 2023-11-01

## 2023-11-01 LAB
HGB A2 MFR BLD: 2.6 % (ref 1.5–3.5)
HGB PNL BLD ELPH: 97.4 % (ref 95.5–100)

## 2023-11-01 NOTE — TELEPHONE ENCOUNTER
From: Carol Ann Nieto  To: Yobany Gallardo  Sent: 11/1/2023 6:34 AM CDT  Subject: Possible yeast infection     I have an itch and a little burn happening. Mostly on my clitoris. What can I do or take to help with this?   Thank you

## 2023-11-08 RX ORDER — PROGESTERONE 200 MG/1
200 CAPSULE ORAL AS DIRECTED
Qty: 35 CAPSULE | Refills: 0 | Status: SHIPPED | OUTPATIENT
Start: 2023-11-08

## 2023-11-14 ENCOUNTER — LAB ENCOUNTER (OUTPATIENT)
Dept: LAB | Facility: HOSPITAL | Age: 33
End: 2023-11-14
Attending: OBSTETRICS & GYNECOLOGY
Payer: COMMERCIAL

## 2023-11-14 DIAGNOSIS — Z00.00 ROUTINE GENERAL MEDICAL EXAMINATION AT A HEALTH CARE FACILITY: Primary | ICD-10-CM

## 2023-11-14 PROCEDURE — 36415 COLL VENOUS BLD VENIPUNCTURE: CPT

## 2023-11-20 ENCOUNTER — TELEPHONE (OUTPATIENT)
Dept: OBGYN CLINIC | Facility: CLINIC | Age: 33
End: 2023-11-20

## 2023-11-20 PROBLEM — Z36.8A ENCOUNTER FOR ANTENATAL SCREENING FOR OTHER GENETIC DEFECT (HCC): Status: ACTIVE | Noted: 2023-11-20

## 2023-11-20 PROBLEM — Z36.8A ENCOUNTER FOR ANTENATAL SCREENING FOR OTHER GENETIC DEFECT: Status: ACTIVE | Noted: 2023-11-20

## 2023-11-20 NOTE — TELEPHONE ENCOUNTER
MAIL BOX is full    You  Jennifer Howard now (3:48 PM)     SHANDRA Cagle,      I have the results of the Prequel but when I called I was not able to leave a message. Your mailbox is full. Please call the office. Any concerns or additional questions, please call us at . Pt called informed of normal prequel and gender in envelope. Xy Pt agrees.

## 2023-11-21 ENCOUNTER — ROUTINE PRENATAL (OUTPATIENT)
Dept: OBGYN CLINIC | Facility: CLINIC | Age: 33
End: 2023-11-21
Payer: COMMERCIAL

## 2023-11-21 VITALS
HEIGHT: 66 IN | BODY MASS INDEX: 31.02 KG/M2 | WEIGHT: 193 LBS | DIASTOLIC BLOOD PRESSURE: 60 MMHG | SYSTOLIC BLOOD PRESSURE: 120 MMHG

## 2023-11-21 DIAGNOSIS — Z34.01 ENCOUNTER FOR SUPERVISION OF NORMAL FIRST PREGNANCY IN FIRST TRIMESTER: Primary | ICD-10-CM

## 2023-11-21 DIAGNOSIS — O09.511 PRIMIGRAVIDA OF ADVANCED MATERNAL AGE IN FIRST TRIMESTER: ICD-10-CM

## 2023-11-21 PROCEDURE — 3078F DIAST BP <80 MM HG: CPT | Performed by: OBSTETRICS & GYNECOLOGY

## 2023-11-21 PROCEDURE — 3074F SYST BP LT 130 MM HG: CPT | Performed by: OBSTETRICS & GYNECOLOGY

## 2023-11-21 PROCEDURE — 3008F BODY MASS INDEX DOCD: CPT | Performed by: OBSTETRICS & GYNECOLOGY

## 2023-11-21 RX ORDER — PHENOL 1.4 %
600 AEROSOL, SPRAY (ML) MUCOUS MEMBRANE
COMMUNITY

## 2023-11-21 RX ORDER — MULTIVITAMIN WITH IRON
250 TABLET ORAL
COMMUNITY

## 2023-11-24 ENCOUNTER — TELEPHONE (OUTPATIENT)
Dept: OBGYN CLINIC | Facility: CLINIC | Age: 33
End: 2023-11-24

## 2023-11-24 NOTE — TELEPHONE ENCOUNTER
+Foresight carrier screen Parviz Disease    Mailbox is full    You  Lizzeth Raffi CHIRAG now (11:59 AM)     SHANDRA Sharma,      I tired to call you to provide the foresight carrier screen results instead your mail box is full. Please call the office and I can review your results with you. Any concerns or additional questions, please call us at .

## 2023-11-27 NOTE — TELEPHONE ENCOUNTER
Called pt informed of +Parviz disease, needs partner testing, sent detailed information from [employer] to pt via email. Pt agrees.

## 2023-12-13 PROBLEM — Z14.8 GENETIC CARRIER: Status: ACTIVE | Noted: 2023-12-13

## 2023-12-20 ENCOUNTER — LAB ENCOUNTER (OUTPATIENT)
Dept: LAB | Facility: REFERENCE LAB | Age: 33
End: 2023-12-20
Attending: OBSTETRICS & GYNECOLOGY
Payer: COMMERCIAL

## 2023-12-20 ENCOUNTER — ROUTINE PRENATAL (OUTPATIENT)
Dept: OBGYN CLINIC | Facility: CLINIC | Age: 33
End: 2023-12-20
Payer: COMMERCIAL

## 2023-12-20 VITALS
HEIGHT: 66 IN | SYSTOLIC BLOOD PRESSURE: 114 MMHG | BODY MASS INDEX: 31.34 KG/M2 | DIASTOLIC BLOOD PRESSURE: 66 MMHG | WEIGHT: 195 LBS

## 2023-12-20 DIAGNOSIS — Z34.01 ENCOUNTER FOR SUPERVISION OF NORMAL FIRST PREGNANCY IN FIRST TRIMESTER: Primary | ICD-10-CM

## 2023-12-20 DIAGNOSIS — Z34.01 ENCOUNTER FOR SUPERVISION OF NORMAL FIRST PREGNANCY IN FIRST TRIMESTER: ICD-10-CM

## 2023-12-20 DIAGNOSIS — Z34.02 ENCOUNTER FOR SUPERVISION OF NORMAL FIRST PREGNANCY IN SECOND TRIMESTER: ICD-10-CM

## 2023-12-20 PROBLEM — O09.511 PRIMIGRAVIDA OF ADVANCED MATERNAL AGE IN FIRST TRIMESTER (HCC): Chronic | Status: ACTIVE | Noted: 2023-11-21

## 2023-12-20 PROBLEM — O09.511 PRIMIGRAVIDA OF ADVANCED MATERNAL AGE IN FIRST TRIMESTER: Chronic | Status: ACTIVE | Noted: 2023-11-21

## 2023-12-20 PROCEDURE — 3074F SYST BP LT 130 MM HG: CPT | Performed by: OBSTETRICS & GYNECOLOGY

## 2023-12-20 PROCEDURE — 82105 ALPHA-FETOPROTEIN SERUM: CPT | Performed by: OBSTETRICS & GYNECOLOGY

## 2023-12-20 PROCEDURE — 3078F DIAST BP <80 MM HG: CPT | Performed by: OBSTETRICS & GYNECOLOGY

## 2023-12-20 PROCEDURE — 3008F BODY MASS INDEX DOCD: CPT | Performed by: OBSTETRICS & GYNECOLOGY

## 2023-12-20 NOTE — PROGRESS NOTES
DW her okay to stop progesterone. No need to repeat blood work. Placenta is now producing enough progesterone.  Check usn and afp

## 2023-12-22 LAB
AFP MOM: 1.08
AFP VALUE: 27.1 NG/ML
GA ON COLL DATE: 15 WEEKS
INSULIN DEP AFP: NO
MAT AGE AT EDD: 35.7 YR
MULTIPLE GEST AFP: NO
OSBR RISK 1 IN AFP: 9540
WEIGHT AFP: 195 LBS

## 2023-12-26 ENCOUNTER — TELEPHONE (OUTPATIENT)
Dept: OBGYN CLINIC | Facility: CLINIC | Age: 33
End: 2023-12-26

## 2023-12-26 NOTE — TELEPHONE ENCOUNTER
Partner Foresight carrier screen negative for Parviz Disease. Mailbox is full. You  Anam Reeder now (1:52 PM)     AD  Margie Zaidi,      I tried to call you and leave a message but your mailbox is full. Joe's foresight carrier screen is Negative for Parviz Disease. Any concerns or additional questions, please call us at .

## 2023-12-27 PROBLEM — Z14.8 GENETIC CARRIER: Status: RESOLVED | Noted: 2023-12-13 | Resolved: 2023-12-27

## 2024-01-09 ENCOUNTER — PATIENT MESSAGE (OUTPATIENT)
Dept: OBGYN CLINIC | Facility: CLINIC | Age: 34
End: 2024-01-09

## 2024-01-10 ENCOUNTER — TELEPHONE (OUTPATIENT)
Dept: OBGYN CLINIC | Facility: CLINIC | Age: 34
End: 2024-01-10

## 2024-01-10 ENCOUNTER — ROUTINE PRENATAL (OUTPATIENT)
Dept: OBGYN CLINIC | Facility: CLINIC | Age: 34
End: 2024-01-10
Payer: COMMERCIAL

## 2024-01-10 VITALS
WEIGHT: 201 LBS | DIASTOLIC BLOOD PRESSURE: 72 MMHG | BODY MASS INDEX: 32.3 KG/M2 | SYSTOLIC BLOOD PRESSURE: 110 MMHG | HEIGHT: 66 IN

## 2024-01-10 DIAGNOSIS — N76.0 VAGINITIS AND VULVOVAGINITIS: ICD-10-CM

## 2024-01-10 DIAGNOSIS — O09.512 PRIMIGRAVIDA OF ADVANCED MATERNAL AGE IN SECOND TRIMESTER: Primary | ICD-10-CM

## 2024-01-10 PROCEDURE — 81514 NFCT DS BV&VAGINITIS DNA ALG: CPT | Performed by: OBSTETRICS & GYNECOLOGY

## 2024-01-10 PROCEDURE — 3008F BODY MASS INDEX DOCD: CPT | Performed by: OBSTETRICS & GYNECOLOGY

## 2024-01-10 PROCEDURE — 3074F SYST BP LT 130 MM HG: CPT | Performed by: OBSTETRICS & GYNECOLOGY

## 2024-01-10 PROCEDURE — 3078F DIAST BP <80 MM HG: CPT | Performed by: OBSTETRICS & GYNECOLOGY

## 2024-01-10 NOTE — TELEPHONE ENCOUNTER
ELIESER to schedule visit    You  Martina Reeder now (8:13 AM)     SHANDRA Fernando,      I tried to call you but left a voice message instead.  Please call the office for further evaluation today or tomorrow.     Any concerns or additional questions, please call us at .        From: Martina Sheikh  To: Lorena Palmer  Sent: 1/9/2024  5:59 PM CST  Subject: Discharge     Hello, I have a question about my discharge. Recently I started to have more so I started ro wear a panty liner, but I have noticed a color change that looks like a yellow brown. Should I be concerned or get it checked? I do have a picture of it if that helps.  Thank you

## 2024-01-10 NOTE — PROGRESS NOTES
34 y/o  at 18 W, No FM yet.   AFP and USN ordered.   She has had a vaginal discharge for 3 days yellow/green, possible small amount of blood. No odor.   Spec: large yeast,   RX sent in for terconazole.

## 2024-01-10 NOTE — TELEPHONE ENCOUNTER
Pharmacy would like clarification on instructions and quantity on the following cream. State the quantity is only for 3 days but the instructions state 7 days.       Terconazole 0.8 % Vaginal Cream

## 2024-01-10 NOTE — TELEPHONE ENCOUNTER
Called LC informed, instruction changed to three days.    Called pharmacy and informed of three days.  Pt agrees.

## 2024-01-11 LAB
BV BACTERIA DNA VAG QL NAA+PROBE: POSITIVE
C GLABRATA DNA VAG QL NAA+PROBE: NEGATIVE
C KRUSEI DNA VAG QL NAA+PROBE: NEGATIVE
CANDIDA DNA VAG QL NAA+PROBE: POSITIVE
T VAGINALIS DNA VAG QL NAA+PROBE: NEGATIVE

## 2024-01-26 ENCOUNTER — TELEPHONE (OUTPATIENT)
Dept: PERINATAL CARE | Facility: HOSPITAL | Age: 34
End: 2024-01-26

## 2024-01-26 ENCOUNTER — HOSPITAL ENCOUNTER (OUTPATIENT)
Dept: PERINATAL CARE | Facility: HOSPITAL | Age: 34
Discharge: HOME | End: 2024-01-26
Attending: OBSTETRICS & GYNECOLOGY
Payer: COMMERCIAL

## 2024-01-26 ENCOUNTER — TELEPHONE (OUTPATIENT)
Dept: OBGYN CLINIC | Facility: CLINIC | Age: 34
End: 2024-01-26

## 2024-01-26 ENCOUNTER — ULTRASOUND ENCOUNTER (OUTPATIENT)
Dept: OBGYN CLINIC | Facility: CLINIC | Age: 34
End: 2024-01-26
Payer: COMMERCIAL

## 2024-01-26 VITALS
BODY MASS INDEX: 33 KG/M2 | HEART RATE: 98 BPM | DIASTOLIC BLOOD PRESSURE: 65 MMHG | SYSTOLIC BLOOD PRESSURE: 105 MMHG | WEIGHT: 205 LBS

## 2024-01-26 DIAGNOSIS — Z34.01 ENCOUNTER FOR SUPERVISION OF NORMAL FIRST PREGNANCY IN FIRST TRIMESTER: ICD-10-CM

## 2024-01-26 DIAGNOSIS — O09.512 PRIMIGRAVIDA OF ADVANCED MATERNAL AGE IN SECOND TRIMESTER: ICD-10-CM

## 2024-01-26 DIAGNOSIS — O26.872 SHORT CERVIX DURING PREGNANCY IN SECOND TRIMESTER: Chronic | ICD-10-CM

## 2024-01-26 DIAGNOSIS — O26.872 SHORT CERVIX DURING PREGNANCY IN SECOND TRIMESTER: Primary | Chronic | ICD-10-CM

## 2024-01-26 DIAGNOSIS — Z34.90 EARLY STAGE OF PREGNANCY: ICD-10-CM

## 2024-01-26 DIAGNOSIS — Z34.02 ENCOUNTER FOR SUPERVISION OF NORMAL FIRST PREGNANCY IN SECOND TRIMESTER: ICD-10-CM

## 2024-01-26 PROCEDURE — 76817 TRANSVAGINAL US OBSTETRIC: CPT | Performed by: OBSTETRICS & GYNECOLOGY

## 2024-01-26 PROCEDURE — 76805 OB US >/= 14 WKS SNGL FETUS: CPT | Performed by: OBSTETRICS & GYNECOLOGY

## 2024-01-26 PROCEDURE — 76817 TRANSVAGINAL US OBSTETRIC: CPT

## 2024-01-26 RX ORDER — PROGESTERONE 100 MG/1
200 CAPSULE ORAL NIGHTLY
Qty: 180 CAPSULE | Refills: 1 | Status: SHIPPED | OUTPATIENT
Start: 2024-01-26 | End: 2024-07-24

## 2024-01-26 NOTE — PROGRESS NOTES
Outpatient Maternal-Fetal Medicine Consultation    Dear Dr. Palmer    Thank you for requesting ultrasound evaluation and maternal fetal medicine consultation on your patient Martina Sheikh.  As you are aware she is a 33 year old female  with a whittaker pregnancy and an Estimated Date of Delivery: 24.  A maternal-fetal medicine consultation was requested secondary to short cervix, Advanced Maternal Age.  Her prenatal records and labs were reviewed.    Ultrasound today.  Transvaginal ultrasound showed a possibly short cervix.  She was referred here.    ROS    HISTORY  OB History    Para Term  AB Living   1 0 0 0 0 0   SAB IAB Ectopic Multiple Live Births   0 0 0 0 0      # Outcome Date GA Lbr Dany/2nd Weight Sex Delivery Anes PTL Lv   1 Current                Allergies:  No Known Allergies   Current Meds:  Current Outpatient Medications   Medication Sig Dispense Refill    magnesium 250 MG Oral Tab Take 1 tablet (250 mg total) by mouth.      Calcium Carbonate 600 MG Oral Tab Take 1 tablet (600 mg total) by mouth.      progesterone (PROMETRIUM) 200 MG Oral Cap Take 1 capsule (200 mg total) by mouth As Directed. (Patient not taking: Reported on 1/10/2024) 35 capsule 0    Prenatal Vit-Fe Fumarate-FA (PRENATAL FA OR) Take by mouth.      acidophilus-pectin Oral Cap Take 1 capsule by mouth daily.      metFORMIN 500 MG Oral Tab Take 1 tablet (500 mg total) by mouth 2 (two) times daily with meals. (Patient taking differently: Take 1 tablet (500 mg total) by mouth daily with breakfast.) 180 tablet 4    ergocalciferol 1.25 MG (80502 UT) Oral Cap Take 1 capsule (50,000 Units total) by mouth once a week. (Patient not taking: Reported on 2023) 24 capsule 0    APPLE CIDER VINEGAR OR Take by mouth. (Patient not taking: Reported on 1/10/2024)          HISTORY:  Past Medical History:   Diagnosis Date    HPV in female     PCOS (polycystic ovarian syndrome)       No past surgical history on file.    Family History   Problem Relation Age of Onset    No Known Problems Father     High Cholesterol Mother     Asthma Maternal Grandmother     Cancer Maternal Grandmother         lung smoker    No Known Problems Maternal Grandfather     Other (altzhirmer) Paternal Grandmother     Dementia Paternal Grandmother     Mental Disorder Sister     Other (Multiple sclerosis) Sister 22    No Known Problems Brother     Breast Cancer Neg     Colon Cancer Neg     Ovarian Cancer Neg       Social History     Socioeconomic History    Marital status:    Occupational History    Occupation: manager   Tobacco Use    Smoking status: Never    Smokeless tobacco: Never   Vaping Use    Vaping Use: Never used   Substance and Sexual Activity    Alcohol use: Not Currently    Drug use: Not Currently    Sexual activity: Yes     Partners: Male   Other Topics Concern    Blood Transfusions No    Caffeine Concern No    Stress Concern No    Weight Concern No    Special Diet No    Exercise Yes    Seat Belt Yes    History of tanning Yes    Reaction to local anesthetic No   Social History Narrative    Live with boyfriend     Social Determinants of Health     Financial Resource Strain: Low Risk  (10/26/2023)    Financial Resource Strain     Difficulty of Paying Living Expenses: Not very hard     Med Affordability: No   Food Insecurity: No Food Insecurity (10/26/2023)    Food Insecurity     Food Insecurity: Never true   Transportation Needs: No Transportation Needs (10/26/2023)    Transportation Needs     Lack of Transportation: No   Stress: No Stress Concern Present (10/26/2023)    Stress     Feeling of Stress : No   Housing Stability: Low Risk  (10/26/2023)    Housing Stability     Housing Instability: No          PHYSICAL EXAMINATION:  LMP 08/05/2023 (Exact Date)   Physical Exam  Constitutional:       Appearance: Normal appearance.   Genitourinary:      Right Labia: No lesions.     Left Labia: No lesions.     Cervical exam comments: Closed, 50%  effaced, posterior and high..   Abdominal:      Palpations: Abdomen is soft.      Tenderness: There is no abdominal tenderness.   Neurological:      Mental Status: She is alert.   Psychiatric:         Mood and Affect: Mood normal.         Behavior: Behavior normal.           OBSTETRIC ULTRASOUND  The patient had a level II ultrasound today which revealed size consistent with dates with limited spine views but an otherwise normal detailed anatomic survey.  Ultrasound findings:   Single IUP in breech presentation.    Placenta is anterior, high.   A 3 vessel cord is noted.  Cardiac activity is present at 146 bpm   g ( 0 lb 13 oz);   MVP is 4.4 cm    The cervix was not able to be clearly visualized and appeared short by transabdominal ultrasound, therefore transvaginal ultrasound was performed. Transvaginal US findings: Cervix is short and funneling seen. Cervical length measured 18.4 mm .    The fetal measurements are consistent with the established EDC. No ultrasound evidence of structural abnormalities are seen today. The nasal bone is present. No ultrasound evidence of markers for aneuploidy are seen. She understands that ultrasound exam cannot exclude genetic abnormalities and that genetic testing is recommended. The limitations of ultrasound were discussed.  See PACS/Imaging Tab For Complete Ultrasound Report  I interpreted the results and reviewed them with the patient.    DISCUSSION  During her visit we discussed and reviewed the following issues:  ADVANCED MATERNAL AGE    Background  I reviewed with the patient that pregnancies in women of advanced maternal age (35 or older at delivery) are associated with elevated risks. Specifically, there is a higher rate of:  Fetal malformations  Preeclampsia  Gestational diabetes  Intrauterine fetal death    As a result, enhanced pregnancy surveillance is advised for these patients including a comprehensive ultrasound to assess for fetal malformations (at 20 weeks)  and a third trimester ultrasound assessment for fetal growth (at 32 weeks). In addition, weekly NST's (initiating at 36 weeks gestation for women 35-39 years and at 32 weeks gestation for women 40 years and older) are also advised. Routine obstetric care is more than adequate to assess for gestational diabetes and preeclampsia; hence, no further significant alterations in obstetric care are advised.    Medical Complications    Women 35 years of age or older can expect to experience two to three fold higher rates of hospitalization,  delivery, and pregnancy-related complications when compared to their younger counterparts.  The two most common medical problems complicating these  pregnanccies are hypertension and diabetes.   The incidence of preeclampsia in the general obstetric population is 3 to 4 percent; this increases to 5 to 10 percent in women over age 40 and is as high as 35 percent in women over age 50.   The incidence of gestational diabetes in the general obstetric population is 3 percent, rising to 7 to 12 percent in women over age 40 and 20 percent in women over age 50.  Women 35 years of age or older are more likely to be delivered by . The  delivery rate in the general obstetric population of the [place] is almost 30 percent, compared to almost 50 percent in women over age 40 to 45 and almost 80 percent in women age 50 to 63.          Fetal Death        A decision analysis tool using data from the [place] [employer]  Database predicted a strategy of weekly antepartum testing and labor induction would lower the risk of unexplained fetal death in women 35 years of age or older. In this model, weekly testing starting at 36 weeks of gestation would drop the risk of fetal death from 5.2 to 1.3 per 1000 pregnancies. While a policy of antepartum testing in older women does increase the chance that a women will be induced (71 inductions per fetal death averted) and thereby  increases her risk of having a  delivery, only 14 additional cesareans would need to be performed to avert one unexplained fetal death.  Hence, weekly NST's are advised for women of advanced maternal age; testing should be initiated at 36 weeks for women 35-39 years and at 32 weeks for women 40 years and older.    Fetal Malformations    Cardiac malformations, clubfoot, and diaphragmatic hernia appear to occur with increased frequency in offspring of older women. These abnormalities are structural and unrelated to aneuploidy, thus they would not be detected by karyotype analysis.  For these reasons a complete, detailed ultrasound (level II) is advised even if the fetus has a normal karyotype.      Fetal Aneuploidy      Invasive Testing  I offered invasive genetic testing (amniocentesis, chorionic villus sampling) after reviewing the diagnostic accuracy of these tests as well as the procedure associated loss rate (1:500 for genetic amniocentesis).    She ultimately does not desire invasive genetic testing.     We discussed  the increased risk of chromosomal abnormalities associated with advanced maternal age at age  33 year old. She understands that ultrasound exam cannot exclude potential genetic abnormalities.  Her estimated risk based on maternal age at term with any chromosome abnormality is about 1: 200 and with Down Syndrome is about 1: 350.   We also discussed the risks and benefits of having  genetic testing ([facility] and amniocentesis) performed.      Non-invasive Pregnancy Testing (NIPT)  I reviewed current non-invasive screening options. Currently non-invasive pregnancy testing (NIPT) offers the highest detection rate (with the lowest false positive rate) for the detection of fetal aneuploidy amongst high-risk patients. The limitations of detailed mid-trimester sonography was reviewed with the patient. First trimester screening and second trimester multiple-marker serum serum screening as alternative  aneuploidy screening options were also reviewed. However, both of these tests are associated with lower detection and higher false positive rates.    Prevention of Preeclampsia    Antiplatelet Agents  The observation that preeclampsia is associated with increased platelet turnover and increased platelet-derived thromboxane levels led to randomized trials evaluating low-dose aspirin therapy in women thought to be at increased risk of the disease. As opposed to higher dose aspirin therapy, low-dose aspirin (60 to 150 mg per day) diminishes platelet thromboxane synthesis while maintaining vascular wall prostacyclin synthesis. Although not well studied, the beneficial effect of low-dose aspirin for prevention of preeclampsia may also be in part related to modulation of inflammation. The drug has been studied for both prevention of preeclampsia and prevention of progression from mild to severe disease. It appears to result in a modest reduction in risk of preeclampsia when given to women at moderate to high risk of preeclampsia.  This approach has been studied in over 35,000 women, for both prevention of preeclampsia and prevention of progression of the disease. Low dose aspirin has a modest impact on pregnancy outcome; it reduces the risk of preeclampsia, as well as other adverse pregnancy outcomes (eg,  delivery, fetal growth restriction) by about 10 to 20 percent. Low dose aspirin is safe in pregnancy; thus, it is a reasonable strategy in women with a moderate to high risk of preeclampsia in whom the benefits outweigh the risks.     Clinical Guidelines  Based on the available data, low-dose aspirin is advised for women at high risk for preeclampsia. There is no consensus on the criteria that confer high risk. The [place] Preventive Services Task Force (USPSTF) risk criteria are reasonable.  USPSTF criteria for high risk include one or more of the following:   Previous pregnancy with preeclampsia,  especially early onset and with an adverse outcome   Multifetal gestation  Chronic hypertension   Type 1 or 2 diabetes mellitus  Chronic kidney disease  Autoimmune disease (antiphospholipid syndrome, systemic lupus erythematosus)     Women with multiple moderate risk factors for preeclampsia are considered high risk, as well. Identification of women with an appropriate combination of moderate risk factors to be considered high risk is subjective and determined case by case, as the data describing the magnitude of the association between each of these risk factors and development of preeclampsia vary widely and lack consistency. USPSTF criteria for moderate risk include:  Nulliparity  Obesity (body mass index >30 kg/m2)  Family history of preeclampsia in mother or sister  Age ?35 years  Sociodemographic characteristics (, low socioeconomic level)  Personal risk factors (eg, history of low birth weight or small for gestational age, previous adverse pregnancy outcome, >10 year pregnancy interval)     If used, daily low-dose aspirin should be initiated in the 12th or 13th week of gestation, although adverse effects from earlier initiation (eg, preconception) have not been documented. The optimum low dose is unclear; 81 mg is readily available, but 100 or 150 mg (which are not available in some countries including the [place] may be more effective.  In some pregnant women, platelet function is not inhibited by the 81 mg dose but is altered by higher dosing. Hence, current evidence has suggested a daily dose of 100 to 150 mg of aspirin rather than a lower dose. In the [place], this can be achieved by taking one and one-half 81 mg tablets; however, taking one 81 mg tablet is also reasonable since this is the commercially available dose and has proven efficacy. For prevention of preeclampsia, no trials have evaluated the efficacy of a higher dose of aspirin, which could be achieved easily by taking  two 81 mg tablets or splitting a 325 mg tablet. For prevention of myocardial infarction and stroke in nonpregnant individuals, aspirin appears to be equally effective at doses between 75 and 325 mg per day.  The safety of low-dose aspirin use in the second and third trimesters is well established, but questions linger regarding use in the first trimester (possible increase in minor vaginal bleeding or gastroschisis). Early therapy (before 16 weeks) may be important since the pathophysiologic features of preeclampsia develop at this time, weeks before clinical disease is apparent. However, available evidence is inconsistent about the importance of early initiation of therapy, possibly because aspirin has major effects on prostacyclin production and endothelial function throughout gestation.  Aspirin is discontinued 5 to 10 days before expected delivery to diminish the risk of bleeding during delivery; however, no adverse maternal or fetal effects related to low-dose aspirin have been proven.  Major questions remain as to which, if any, subgroups of women are more likely to benefit from low-dose aspirin therapy, when treatment should be started (first or second trimester), and the optimum dose to inhibit placental PGH synthase (cyclooxygenase) and also allow the anti-inflammatory effects of low-dose aspirin.      Decision on aspirin can wait until we further evaluated her cervix next week.  She is interested in taking the low-dose aspirin.       SHORT CERVIX --   I discussed the above findings with the patient.  I reviewed the increased risk of  delivery associated with a short cervix and options of treatement.   When a cervix is shorter than 2.5cm with a history of  delivery, then cerclage is recommended.  When the cervix is 2.5cm or less without a history of  delivery, then vaginal progesterone 200mg daily is recommended .  In women without a history of  birth, studies have not definitely  shown a benefit of a cerclage in a women with a short closed cervix. If the short cervix is open in a patient without a history of  delivery, then cerclage becomes an option.   I also gave her the option of no medication and following with cervical lengths.         Recent guidelines were updated in 2021 from acog regarding prevention of  birth.  The person that has not had a previous  birth, vaginal progesterone is recommended and the cervix is less than 25 mm.  Her cervix is 18 mm.  There is insufficient data to routinely recommend a cerclage for a short cervix, but that may be of benefit for the person with a cervix less than 10 mm.  With the cervix less than 15 mm, one study showed a  birth rate prior to 34 weeks of 19% in the vaginal progesterone treatment group versus 34% in the placebo group.  In women with a cervix of 10 to 20 mm at 20 to 23 weeks 6 days had a lower  birth rate prior to 33 weeks in the vaginal progesterone treatment group versus 15.5% in the placebo group.    We discussed that cerclage does not reduce the overall  birth rate but may increase the gestational age when the birth occurs.  When women have a cervix less than 10 mm, the  birth rate is 39.5% in the cerclage group versus 58% in the group without cerclage.    Cervix is closed on exam.  Plan for vaginal progesterone and re-evaluation of cervix in 1 week.    If cerclage continues to shorten, is very short, or is open, then consideration may be made for cerclage on day of ultrasound. Hopefully, progesterone will stabilize cervix    IMPRESSION:  IUP at 20w3d   AMA --NIPT low risk, declines invasive testing   Overweight  Cervix short.    RECOMMENDATIONS:  Continue care with Dr. Palmer  Follow-up Growth ultrasound with BPP at 32 weeks.  Weekly NST's at 36 weeks.  Vaginal progesterone 200 mg nightly  Cervical length in 1 week.  Consider low-dose aspirin that can be started  after cervical lengths are confirmed to be stable.          Thank you for allowing me to participate in the care of your patient.  Please do not hesitate to contact me if additional questions or concerns arise.      Jailene Chavez M.D.    40 minutes spent in review of records, patient consultation, documentation and coordination of care.  The relevant clinical matter(s) are summarized above.     Note to patient and family  The 21st Century Cures Act makes medical notes available to patients in the interest of transparency.  However, please be advised that this is a medical document.  It is intended as [id number] communication.  It is written and medical language may contain abbreviations or verbiage that are technical and unfamiliar.  It may appear blunt or direct.  Medical documents are intended to carry relevant information, facts as evident, and the clinical opinion of the practitioner.

## 2024-01-26 NOTE — TELEPHONE ENCOUNTER
Called and dw pt us showed shortened cervix today. Recommend pt be seen by MFM today to check cervical length.

## 2024-01-29 ENCOUNTER — ROUTINE PRENATAL (OUTPATIENT)
Dept: OBGYN CLINIC | Facility: CLINIC | Age: 34
End: 2024-01-29
Payer: COMMERCIAL

## 2024-01-29 VITALS
WEIGHT: 205.81 LBS | HEIGHT: 66 IN | SYSTOLIC BLOOD PRESSURE: 118 MMHG | BODY MASS INDEX: 33.07 KG/M2 | DIASTOLIC BLOOD PRESSURE: 72 MMHG

## 2024-01-29 DIAGNOSIS — Z34.02 ENCOUNTER FOR SUPERVISION OF NORMAL FIRST PREGNANCY IN SECOND TRIMESTER: Primary | ICD-10-CM

## 2024-01-29 PROBLEM — O09.529 ANTEPARTUM MULTIGRAVIDA OF ADVANCED MATERNAL AGE (HCC): Status: ACTIVE | Noted: 2023-11-21

## 2024-01-29 PROBLEM — O09.529 ANTEPARTUM MULTIGRAVIDA OF ADVANCED MATERNAL AGE: Status: ACTIVE | Noted: 2023-11-21

## 2024-01-29 RX ORDER — FAMOTIDINE 20 MG/1
20 TABLET, FILM COATED ORAL 2 TIMES DAILY
Qty: 60 TABLET | Refills: 1 | Status: SHIPPED | OUTPATIENT
Start: 2024-01-29

## 2024-01-29 NOTE — PROGRESS NOTES
Denies pain, bleeding, LOF. Positive fetal movement    Was seen by MFM  for shortened cervix on US  Had normal level 2 ultrasound - limited spine views. Cervical length 18.4 mm    33 year old  at 20w6d by 7/3wks    Return OB  Pre- Care: UTD. Glucola and cbc orders placed for next visit  Patient Active Problem List   Diagnosis    neg cfDNA  pregnancy    Antepartum multigravida of advanced maternal age    Short cervix during pregnancy in second trimester   Was prescribed vaginal progesterone  Plan growth ultrasound with BPP@32 weeks and weekly NSTs at 36 weeks.   Has follow up appointment for repeat CL on  and consideration of cerclage     - Return to clinic in: 4

## 2024-01-30 ENCOUNTER — MED REC SCAN ONLY (OUTPATIENT)
Dept: OBGYN CLINIC | Facility: CLINIC | Age: 34
End: 2024-01-30

## 2024-01-31 ENCOUNTER — TELEPHONE (OUTPATIENT)
Dept: OBGYN UNIT | Facility: HOSPITAL | Age: 34
End: 2024-01-31

## 2024-01-31 NOTE — PROGRESS NOTES
Outpatient Maternal-Fetal Medicine Follow-Up     Dear Dr. Palmer,     Thank you for requesting ultrasound evaluation and maternal fetal medicine consultation on your patient Martina Sheikh.  As you are aware she is a 33 year old female  with a Haile pregnancy and an Estimated Date of Delivery: 24.  She returned to maternal-fetal medicine today for a follow-up visit.  Her history was reviewed from her prior visit and there were no interval changes.     Antepartum Risk Factors  AMA  Short cervix in the second trimester      S: She has been on modified bedrest all week.  At her work she walks about 20-30,000 steps a day so she took the week off from work.  She has been using the nightly progesterone and has no complications with that.  She denies any cramping or bleeding.  PHYSICAL EXAMINATION:  /67   Pulse 80   Wt 205 lb (93 kg)   LMP 2023 (Exact Date)   BMI 33.09 kg/m²   General: alert and oriented, no acute distress  Abdomen: gravid, soft, non-tender  Extremities: non-tender, no edema     OBSTETRIC ULTRASOUND  The patient had a limited and transvaginal ultrasound today which I interpreted the results and reviewed them with the patient.    Ultrasound Findings:  Single IUP in breech presentation.    Placenta is anterior.   A 3 vessel cord is noted.  Cardiac activity is present at 145 bpm  MVP is 6 cm    The cervix was not able to be clearly visualized and appeared short by transabdominal ultrasound, therefore transvaginal ultrasound was performed.     Transvaginal US findings: Cervix is short and funneling seen. Cervical length measured 10.7 mm; Funneling: U-shaped funnel that that has a width with fundal pressure 12.0 mm, Funnel length with fundal pressure 16.0 mm    See imaging tab for the complete US report.     DISCUSSION  During her visit we discussed and reviewed the following issues:  ADVANCED MATERNAL AGE   morbidity and mortality associated with advancing  maternal age was discussed previously and reviewed.  See MFM note from 24 for detailed review.  I reviewed with the patient that pregnancies in women of advanced maternal age (35 or older at delivery) are associated with elevated risks. Specifically, there is a higher rate of:  Fetal malformations  Preeclampsia  Gestational diabetes  Intrauterine fetal death     As a result, enhanced pregnancy surveillance is advised for these patients including a comprehensive ultrasound to assess for fetal malformations (at 20 weeks) and a third trimester ultrasound assessment for fetal growth (at 32 weeks). In addition, weekly NST's (initiating at 36 weeks gestation for women 35-39 years and at 32 weeks gestation for women 40 years and older) are also advised. Routine obstetric care is more than adequate to assess for gestational diabetes and preeclampsia; hence, no further significant alterations in obstetric care are advised.        Prevention of Preeclampsia  Discussed previously and reviewed. See MFM note from 24 for detailed review.          SHORT CERVIX --   I discussed the above findings with the patient.  I reviewed the increased risk of  delivery associated with a short cervix and options of treatement.   See MFM note from 24 for detailed review.         Recent guidelines were updated in 2021 from [employer] regarding prevention of  birth.  The person that has not had a previous  birth, vaginal progesterone is recommended and the cervix is less than 25 mm.  Her cervix is 18 mm.  There is insufficient data to routinely recommend a cerclage for a short cervix, but that may be of benefit for the person with a cervix less than 10 mm.  With the cervix less than 15 mm, one study showed a  birth rate prior to 34 weeks of 19% in the vaginal progesterone treatment group versus 34% in the placebo group.  In women with a cervix of 10 to 20 mm at 20 to 23 weeks 6 days had a lower   birth rate prior to 33 weeks in the vaginal progesterone treatment group versus 15.5% in the placebo group.     We discussed that cerclage does not reduce the overall  birth rate but may increase the gestational age when the birth occurs.  When women have a cervix less than 10 mm, the  birth rate is 39.5% in the cerclage group versus 58% in the group without cerclage.     Light of the cervical change and now the funneling is increased with the cervical length now only 10 mm, we discussed the options of continued modified bedrest and vaginal progesterone versus cervical cerclage and progesterone.    I discussed the risks and benefits of cerclage placement  including PROM,  labor, bleeding, infection and although rare, maternal death.  I also reviewed that cerclage removal would be advised in the presence of active  labor, intra-amniotic infection, PROM or significant vaginal bleeding.  I discussed the need for continued monitoring for such  complications.    We discussed the role of the vaginal progesterone as well as a 72-hour course of ibuprofen to help reduce the symptoms and potential risks of surgery.  I discussed the role of antibiotics that are given preoperatively to help reduce the risk of infection.     Norah SANTOS had had their questions answered to their satisfaction.  Noarh requested to move forward with a cervical cerclage which will be done this afternoon.  Discharge home today is anticipated.       IMPRESSION:  IUP at 21w2d   Short funneling cervix in the second trimester  AMA --NIPT low risk, declines invasive testing   Overweight       RECOMMENDATIONS:  Continue care with Dr. Palmer  Follow-up Growth ultrasound with BPP at 32 weeks.  Weekly NST's at 36 weeks.  Vaginal progesterone 200 mg nightly  Cervical length in 1 week as follow up after the cerclage  Daily ow-dose aspirin         Total time spent 40 minutes this calendar day which includes preparing to  see the patient including chart review, obtaining and/or reviewing additional medical history, performing a physical exam and evaluation, documenting clinical information in the electronic medical record, independently interpreting results, counseling the patient, communicating results to the patient/family/caregiver and coordinating care.     Case discussed with patient who demonstrated understanding and agreement with plan.     Thank you for allowing me to participate in the care of this patient.  Please feel free to contact me with any questions.    Mya Alejandra MD  Maternal-Fetal Medicine       Note to patient and family:  The 21st Century Cures Act makes medical notes available to patients in the interest of transparency.  However, please be advised that this is a medical document.  It is intended as a peer to peer communication.  It is written in medical language and may contain abbreviations or verbiage that are technical and unfamiliar.  It may appear blunt or direct.  Medical documents are intended to carry relevant information, facts as evident, and the clinical opinion of the practitioner.

## 2024-02-01 ENCOUNTER — ANESTHESIA (OUTPATIENT)
Dept: OBGYN UNIT | Facility: HOSPITAL | Age: 34
End: 2024-02-01
Payer: COMMERCIAL

## 2024-02-01 ENCOUNTER — HOSPITAL ENCOUNTER (OUTPATIENT)
Facility: HOSPITAL | Age: 34
Discharge: HOME | End: 2024-02-01
Attending: OBSTETRICS & GYNECOLOGY | Admitting: OBSTETRICS & GYNECOLOGY
Payer: COMMERCIAL

## 2024-02-01 ENCOUNTER — ANESTHESIA EVENT (OUTPATIENT)
Dept: OBGYN UNIT | Facility: HOSPITAL | Age: 34
End: 2024-02-01
Payer: COMMERCIAL

## 2024-02-01 ENCOUNTER — HOSPITAL ENCOUNTER (OUTPATIENT)
Dept: PERINATAL CARE | Facility: HOSPITAL | Age: 34
Discharge: HOME | End: 2024-02-01
Attending: OBSTETRICS & GYNECOLOGY
Payer: COMMERCIAL

## 2024-02-01 VITALS
BODY MASS INDEX: 33.11 KG/M2 | TEMPERATURE: 98 F | OXYGEN SATURATION: 99 % | SYSTOLIC BLOOD PRESSURE: 113 MMHG | WEIGHT: 206 LBS | HEIGHT: 66 IN | RESPIRATION RATE: 18 BRPM | HEART RATE: 75 BPM | DIASTOLIC BLOOD PRESSURE: 50 MMHG

## 2024-02-01 VITALS
WEIGHT: 205 LBS | HEART RATE: 80 BPM | SYSTOLIC BLOOD PRESSURE: 101 MMHG | BODY MASS INDEX: 33 KG/M2 | DIASTOLIC BLOOD PRESSURE: 67 MMHG

## 2024-02-01 DIAGNOSIS — E66.3 OVERWEIGHT (BMI 25.0-29.9): ICD-10-CM

## 2024-02-01 DIAGNOSIS — O26.872 SHORT CERVIX DURING PREGNANCY IN SECOND TRIMESTER: Chronic | ICD-10-CM

## 2024-02-01 DIAGNOSIS — O26.872 SHORT CERVIX DURING PREGNANCY IN SECOND TRIMESTER: Primary | Chronic | ICD-10-CM

## 2024-02-01 DIAGNOSIS — O09.512 PRIMIGRAVIDA OF ADVANCED MATERNAL AGE IN SECOND TRIMESTER: ICD-10-CM

## 2024-02-01 PROBLEM — Z34.90 PREGNANCY (HCC): Status: ACTIVE | Noted: 2024-02-01

## 2024-02-01 PROBLEM — O09.519 AMA (ADVANCED MATERNAL AGE) PRIMIGRAVIDA 35+ (HCC): Status: ACTIVE | Noted: 2023-11-21

## 2024-02-01 PROBLEM — O09.519 AMA (ADVANCED MATERNAL AGE) PRIMIGRAVIDA 35+: Status: ACTIVE | Noted: 2023-11-21

## 2024-02-01 PROBLEM — Z34.90 PREGNANCY: Status: ACTIVE | Noted: 2024-02-01

## 2024-02-01 LAB
BASOPHILS # BLD AUTO: 0.07 X10(3) UL (ref 0–0.2)
BASOPHILS NFR BLD AUTO: 0.5 %
BILIRUB UR QL: NEGATIVE
CLARITY UR: CLEAR
DEPRECATED RDW RBC AUTO: 39.1 FL (ref 35.1–46.3)
EOSINOPHIL # BLD AUTO: 0.25 X10(3) UL (ref 0–0.7)
EOSINOPHIL NFR BLD AUTO: 1.7 %
ERYTHROCYTE [DISTWIDTH] IN BLOOD BY AUTOMATED COUNT: 12.9 % (ref 11–15)
GLUCOSE UR-MCNC: NORMAL MG/DL
HCT VFR BLD AUTO: 36.7 %
HGB BLD-MCNC: 12.4 G/DL
HGB UR QL STRIP.AUTO: NEGATIVE
IMM GRANULOCYTES # BLD AUTO: 0.2 X10(3) UL (ref 0–1)
IMM GRANULOCYTES NFR BLD: 1.4 %
KETONES UR-MCNC: NEGATIVE MG/DL
LEUKOCYTE ESTERASE UR QL STRIP.AUTO: 25
LYMPHOCYTES # BLD AUTO: 2.42 X10(3) UL (ref 1–4)
LYMPHOCYTES NFR BLD AUTO: 16.9 %
MCH RBC QN AUTO: 28.6 PG (ref 26–34)
MCHC RBC AUTO-ENTMCNC: 33.8 G/DL (ref 31–37)
MCV RBC AUTO: 84.6 FL
MONOCYTES # BLD AUTO: 0.55 X10(3) UL (ref 0.1–1)
MONOCYTES NFR BLD AUTO: 3.8 %
NEUTROPHILS # BLD AUTO: 10.8 X10 (3) UL (ref 1.5–7.7)
NEUTROPHILS # BLD AUTO: 10.8 X10(3) UL (ref 1.5–7.7)
NEUTROPHILS NFR BLD AUTO: 75.7 %
NITRITE UR QL STRIP.AUTO: NEGATIVE
PH UR: 8 [PH] (ref 5–8)
PLATELET # BLD AUTO: 291 10(3)UL (ref 150–450)
RBC # BLD AUTO: 4.34 X10(6)UL
SP GR UR STRIP: 1.02 (ref 1–1.03)
UROBILINOGEN UR STRIP-ACNC: NORMAL
WBC # BLD AUTO: 14.3 X10(3) UL (ref 4–11)

## 2024-02-01 PROCEDURE — 59320 REVISION OF CERVIX: CPT

## 2024-02-01 PROCEDURE — 87086 URINE CULTURE/COLONY COUNT: CPT | Performed by: OBSTETRICS & GYNECOLOGY

## 2024-02-01 PROCEDURE — 81001 URINALYSIS AUTO W/SCOPE: CPT | Performed by: OBSTETRICS & GYNECOLOGY

## 2024-02-01 PROCEDURE — 76815 OB US LIMITED FETUS(S): CPT

## 2024-02-01 PROCEDURE — 76817 TRANSVAGINAL US OBSTETRIC: CPT | Performed by: OBSTETRICS & GYNECOLOGY

## 2024-02-01 PROCEDURE — 85025 COMPLETE CBC W/AUTO DIFF WBC: CPT | Performed by: OBSTETRICS & GYNECOLOGY

## 2024-02-01 RX ORDER — DEXAMETHASONE SODIUM PHOSPHATE 4 MG/ML
VIAL (ML) INJECTION AS NEEDED
Status: DISCONTINUED | OUTPATIENT
Start: 2024-02-01 | End: 2024-02-01 | Stop reason: SURG

## 2024-02-01 RX ORDER — ACETAMINOPHEN 500 MG
500 TABLET ORAL EVERY 6 HOURS PRN
Status: DISCONTINUED | OUTPATIENT
Start: 2024-02-01 | End: 2024-02-01

## 2024-02-01 RX ORDER — SODIUM CHLORIDE, SODIUM LACTATE, POTASSIUM CHLORIDE, CALCIUM CHLORIDE 600; 310; 30; 20 MG/100ML; MG/100ML; MG/100ML; MG/100ML
INJECTION, SOLUTION INTRAVENOUS CONTINUOUS
Status: DISCONTINUED | OUTPATIENT
Start: 2024-02-01 | End: 2024-02-01

## 2024-02-01 RX ORDER — CITRIC ACID/SODIUM CITRATE 334-500MG
30 SOLUTION, ORAL ORAL ONCE
Status: COMPLETED | OUTPATIENT
Start: 2024-02-01 | End: 2024-02-01

## 2024-02-01 RX ORDER — KETOROLAC TROMETHAMINE 30 MG/ML
30 INJECTION, SOLUTION INTRAMUSCULAR; INTRAVENOUS ONCE
Status: DISCONTINUED | OUTPATIENT
Start: 2024-02-01 | End: 2024-02-01

## 2024-02-01 RX ORDER — NALBUPHINE HYDROCHLORIDE 10 MG/ML
2.5 INJECTION, SOLUTION INTRAMUSCULAR; INTRAVENOUS; SUBCUTANEOUS
Status: DISCONTINUED | OUTPATIENT
Start: 2024-02-01 | End: 2024-02-01

## 2024-02-01 RX ORDER — INDOMETHACIN 50 MG/1
100 CAPSULE ORAL ONCE
Status: COMPLETED | OUTPATIENT
Start: 2024-02-01 | End: 2024-02-01

## 2024-02-01 RX ORDER — ONDANSETRON 2 MG/ML
4 INJECTION INTRAMUSCULAR; INTRAVENOUS ONCE AS NEEDED
Status: DISCONTINUED | OUTPATIENT
Start: 2024-02-01 | End: 2024-02-01

## 2024-02-01 RX ORDER — METRONIDAZOLE 500 MG/100ML
500 INJECTION, SOLUTION INTRAVENOUS EVERY 8 HOURS
Status: COMPLETED | OUTPATIENT
Start: 2024-02-01 | End: 2024-02-01

## 2024-02-01 RX ORDER — INDOMETHACIN 50 MG/1
CAPSULE ORAL
Status: DISCONTINUED
Start: 2024-02-01 | End: 2024-02-01

## 2024-02-01 RX ORDER — LIDOCAINE HYDROCHLORIDE 10 MG/ML
INJECTION, SOLUTION EPIDURAL; INFILTRATION; INTRACAUDAL; PERINEURAL AS NEEDED
Status: DISCONTINUED | OUTPATIENT
Start: 2024-02-01 | End: 2024-02-01 | Stop reason: SURG

## 2024-02-01 RX ORDER — BUPIVACAINE HYDROCHLORIDE 7.5 MG/ML
INJECTION, SOLUTION INTRASPINAL AS NEEDED
Status: DISCONTINUED | OUTPATIENT
Start: 2024-02-01 | End: 2024-02-01 | Stop reason: SURG

## 2024-02-01 RX ORDER — ONDANSETRON 2 MG/ML
INJECTION INTRAMUSCULAR; INTRAVENOUS AS NEEDED
Status: DISCONTINUED | OUTPATIENT
Start: 2024-02-01 | End: 2024-02-01 | Stop reason: SURG

## 2024-02-01 RX ORDER — CEFAZOLIN SODIUM/WATER 2 G/20 ML
2 SYRINGE (ML) INTRAVENOUS ONCE
Status: COMPLETED | OUTPATIENT
Start: 2024-02-01 | End: 2024-02-01

## 2024-02-01 RX ORDER — INDOMETHACIN 50 MG/1
50 CAPSULE ORAL EVERY 6 HOURS
Status: DISCONTINUED | OUTPATIENT
Start: 2024-02-01 | End: 2024-02-01

## 2024-02-01 RX ORDER — ACETAMINOPHEN 500 MG
1000 TABLET ORAL EVERY 6 HOURS PRN
Status: DISCONTINUED | OUTPATIENT
Start: 2024-02-01 | End: 2024-02-01

## 2024-02-01 RX ADMIN — LIDOCAINE HYDROCHLORIDE 5 ML: 10 INJECTION, SOLUTION EPIDURAL; INFILTRATION; INTRACAUDAL; PERINEURAL at 12:28:00

## 2024-02-01 RX ADMIN — BUPIVACAINE HYDROCHLORIDE 1.2 ML: 7.5 INJECTION, SOLUTION INTRASPINAL at 12:33:00

## 2024-02-01 RX ADMIN — DEXAMETHASONE SODIUM PHOSPHATE 4 MG: 4 MG/ML VIAL (ML) INJECTION at 12:36:00

## 2024-02-01 RX ADMIN — ONDANSETRON 4 MG: 2 INJECTION INTRAMUSCULAR; INTRAVENOUS at 12:36:00

## 2024-02-01 RX ADMIN — SODIUM CHLORIDE, SODIUM LACTATE, POTASSIUM CHLORIDE, CALCIUM CHLORIDE: 600; 310; 30; 20 INJECTION, SOLUTION INTRAVENOUS at 12:26:00

## 2024-02-01 NOTE — OPERATIVE REPORT
Date of surgery:  2/1/24    Preoperative Diagnosis: 1. IUP at 21w2d  2.  Short funneling cervix in the second trimester c/w incompetent cervix      Postoperative diagnosis:    Same    Procedure:   Rescue Cerclage    Surgeon:  Mya Alejandra M.D.  Estimated Blood loss:  10 ml  Complications:   None  Instrument count:  Correct  Anesthesia:  Spinal    Technique:   After adequate anesthesia was achieved, she was draped and prepped in the usual fashion in a  Dorsal lithomy position.   A weighted speculum was placed into the vagina and cervix was exposed.  SVE - 1 cm/50%/ high, soft.  The cervix was grasped with a ring forcep at 12 O'clock and 6 O'clock positions.  Gentle traction applied.   A stitch of number 5 Ethibond was used to place the cerclage in a purse string type manner.  The stitch was tied at the 12 O'clock position.  No complications.         A straight catheter was then used to drain the bladder of  10 ml of clear yellow urine.   She tolerated the procedure well and was returned to her room in good condition.  Discharge home after recovery is anticipated.

## 2024-02-01 NOTE — ANESTHESIA PREPROCEDURE EVALUATION
Anesthesia PreOp Note    HPI:     Martina Sheikh is a 33 year old female who presents for preoperative consultation requested by: Mya Alejandra MD    Date of Surgery: 2024    Procedure(s):  CERCLAGE  Indication: * No pre-op diagnosis entered *    Relevant Problems   No relevant active problems       NPO:        Last Solid Consumption Date: 24  Last Solid Consumption Time: 2200             History Review:  Patient Active Problem List    Diagnosis Date Noted    Pregnancy 2024    Short cervix during pregnancy in second trimester 2024    AMA (advanced maternal age) primigravida 35+ 2023    neg cfDNA  pregnancy 2023       Past Medical History:   Diagnosis Date    HPV in female     PCOS (polycystic ovarian syndrome)        History reviewed. No pertinent surgical history.    Medications Prior to Admission   Medication Sig Dispense Refill Last Dose    progesterone 100 MG Oral Cap Place 2 capsules (200 mg total) vaginally nightly. OK to substitute with vaginal progesterone that insurance covers 180 capsule 1 2024    magnesium 250 MG Oral Tab Take 1 tablet (250 mg total) by mouth.   Past Week    Prenatal Vit-Fe Fumarate-FA (PRENATAL FA OR) Take by mouth.   2024    famotidine (PEPCID) 20 MG Oral Tab Take 1 tablet (20 mg total) by mouth 2 (two) times daily. 60 tablet 1     progesterone (PROMETRIUM) 100 MG Oral Cap Place 2 capsules (200 mg total) vaginally nightly. May substitute with progesterone that is used vaginally and covered by her insurance. (Patient not taking: Reported on 2024) 180 capsule 1     [] metRONIDAZOLE (FLAGYL) 500 MG Oral Tab Take 1 tablet (500 mg total) by mouth 2 (two) times daily for 7 days. 14 tablet 0     [] Terconazole 0.8 % Vaginal Cream Place 1 applicator vaginally nightly for 7 days. 20 g 0     Calcium Carbonate 600 MG Oral Tab Take 1 tablet (600 mg total) by mouth. (Patient not taking: Reported on 2024)        acidophilus-pectin Oral Cap Take 1 capsule by mouth daily. (Patient not taking: Reported on 1/29/2024)       metFORMIN 500 MG Oral Tab Take 1 tablet (500 mg total) by mouth 2 (two) times daily with meals. (Patient not taking: Reported on 1/29/2024) 180 tablet 4     ergocalciferol 1.25 MG (30987 UT) Oral Cap Take 1 capsule (50,000 Units total) by mouth once a week. (Patient not taking: Reported on 9/6/2023) 24 capsule 0     APPLE CIDER VINEGAR OR Take by mouth. (Patient not taking: Reported on 1/10/2024)        Current Facility-Administered Medications Ordered in Epic   Medication Dose Route Frequency Provider Last Rate Last Admin    lactated ringers infusion   Intravenous Continuous Mya Alejandra  mL/hr at 02/01/24 1055 New Bag at 02/01/24 1055    metRONIDAZOLE in sodium chloride 0.79% (Flagyl) 5 mg/mL IVPB premix 500 mg  500 mg Intravenous Q8H Mya Alejandra  mL/hr at 02/01/24 1125 500 mg at 02/01/24 1125     No current [facility]-ordered outpatient medications on file.       No Known Allergies    Family History   Problem Relation Age of Onset    No Known Problems Father     High Cholesterol Mother     Asthma Maternal Grandmother     Cancer Maternal Grandmother         lung smoker    No Known Problems Maternal Grandfather     Other (altzhirmer) Paternal Grandmother     Dementia Paternal Grandmother     Mental Disorder Sister     Other (Multiple sclerosis) Sister 22    No Known Problems Brother     Breast Cancer Neg     Colon Cancer Neg     Ovarian Cancer Neg      Social History     Socioeconomic History    Marital status:    Occupational History    Occupation: manager   Tobacco Use    Smoking status: Never     Passive exposure: Never    Smokeless tobacco: Never   Vaping Use    Vaping Use: Never used   Substance and Sexual Activity    Alcohol use: Not Currently    Drug use: Not Currently    Sexual activity: Yes     Partners: Male   Other Topics Concern    Blood Transfusions No    Caffeine Concern No     Stress Concern No    Weight Concern No    Special Diet No    Exercise Yes    Seat Belt Yes    History of tanning Yes    Reaction to local anesthetic No       Available pre-op labs reviewed.  Lab Results   Component Value Date    WBC 14.3 (H) 02/01/2024    RBC 4.34 02/01/2024    HGB 12.4 02/01/2024    HCT 36.7 02/01/2024    MCV 84.6 02/01/2024    MCH 28.6 02/01/2024    MCHC 33.8 02/01/2024    RDW 12.9 02/01/2024    .0 02/01/2024             Vital Signs:  Body mass index is 33.25 kg/m².   height is 1.676 m (5' 6\") and weight is 93.4 kg (206 lb). Her oral temperature is 98 °F (36.7 °C). Her blood pressure is 110/67 and her pulse is 82. Her respiration is 18.   Vitals:    02/01/24 1130   BP: 110/67   Pulse: 82   Resp: 18   Temp: 98 °F (36.7 °C)   TempSrc: Oral   Weight: 93.4 kg (206 lb)   Height: 1.676 m (5' 6\")        Anesthesia Evaluation     Patient summary reviewed    No history of anesthetic complications   Airway   Mallampati: II  TM distance: >3 FB  Neck ROM: full  Dental - Dentition appears grossly intact     Pulmonary - negative ROS and normal exam   (-) asthma, shortness of breath, recent URI  Cardiovascular - negative ROS  Exercise tolerance: good  (-) hypertension, dysrhythmias    Rhythm: regular  Rate: normal    Neuro/Psych - negative ROS   (-) seizures, neuromuscular disease    GI/Hepatic/Renal - negative ROS   (-) hepatitis, liver disease, renal disease    Endo/Other    (-) diabetes mellitus, blood dyscrasia    Comments: PCOS   Abdominal   (+) obese                 Anesthesia Plan:   ASA:  2  Plan:   Spinal  Post-op Pain Management: Intrathecal narcotics  Informed Consent Plan and Risks Discussed With:  Patient  Discussed plan with:  Surgeon      I have informed Martina Sheikh and/or legal guardian or family member of the nature of the anesthetic plan, benefits, risks including possible dental damage if relevant, major complications, and any alternative forms of anesthetic management.   All of the  patient's questions were answered to the best of my ability. The patient desires the anesthetic management as planned.  Brooklyn Spencer DO  2/1/2024 12:02 PM  Present on Admission:  **None**

## 2024-02-01 NOTE — ANESTHESIA POSTPROCEDURE EVALUATION
Patient: Martina Sheikh    Procedure Summary       Date: 02/01/24 Room / Location: [facility] L+D OR 02 / [facility] L+D OR    Anesthesia Start: 1226 Anesthesia Stop: 1303    Procedure: CERCLAGE Diagnosis:     Surgeons: Mya Alejandra MD Anesthesiologist: Brooklyn Spencer DO    Anesthesia Type: spinal ASA Status: 2            Anesthesia Type: spinal    Vitals Value Taken Time   /59 02/01/24 1303   Temp 97.6 02/01/24 1308   Pulse 75 02/01/24 1307   Resp 22 02/01/24 1307   SpO2 100 % 02/01/24 1307   Vitals shown include unfiled device data.    [facility] AN Post Evaluation:   Patient Evaluated in PACU  Patient Participation: complete - patient participated  Level of Consciousness: awake and alert  Pain Score: 0  Pain Management: adequate  Airway Patency:patent  Dental exam unchanged from preop  Yes    Cardiovascular Status: hemodynamically stable  Respiratory Status: spontaneous ventilation, unassisted, room air and nonlabored ventilation  Postoperative Hydration stable      Brooklyn Spencer DO  2/1/2024 1:08 PM

## 2024-02-01 NOTE — ADDENDUM NOTE
Encounter addended by: Deepthi Pindea on: 2/1/2024 8:52 AM   Actions taken: Charge Capture section accepted

## 2024-02-01 NOTE — PROGRESS NOTES
Pt is a 33 year old female admitted to TR5/TR5-A.     Chief Complaint   Patient presents with     Complication     Scheduled cerclage      Pt is  21w2d intra-uterine pregnancy.  History obtained, consents signed. Oriented to room, staff, and plan of care.

## 2024-02-01 NOTE — ANESTHESIA PROCEDURE NOTES
Spinal Block    Date/Time: 2/1/2024 12:29 PM    Performed by: Brooklyn Spencer DO  Authorized by: Brooklyn Spencer DO      General Information and Staff    Start Time:  2/1/2024 12:29 PM  End Time:  2/1/2024 12:33 PM  Anesthesiologist:  Brooklyn Spencer DO  Performed by:  Anesthesiologist  Patient Location:  OR  Site identification: surface landmarks    Reason for Block: surgical anesthesia    Preanesthetic Checklist: patient identified, IV checked, risks and benefits discussed, monitors and equipment checked, pre-op evaluation, timeout performed, anesthesia consent and sterile technique used      Procedure Details    Patient Position:  Sitting  Prep: ChloraPrep and patient draped    Monitoring:  Cardiac monitor and continuous pulse ox  Approach:  Midline  Location:  L3-4  Injection Technique:  Single-shot    Needle    Needle Type:  Pencil-tip  Needle Gauge:  24 G  Needle Length:  4 in    Assessment    Sensory Level:   Events: clear CSF, CSF aspirated, well tolerated and blood negative      Additional Comments

## 2024-02-01 NOTE — H&P
[facility]  Maternal-Fetal Medicine H&P    Date of Admission:  2024    History of Present Illness:  Martina Sheikh is a a(n) 33 year old female. G1 with an IUP at Gestational Age: 21w2d    Who  presents for a rescue cervical cerclage today due to progressive cervical shortening in the second trimester.  At the time of her level 2 ultrasound last week, there was an incidental finding of a short cervix.  She was started on vaginal progesterone and observed modified bedrest and had a follow-up cervical ultrasound this morning.  There has been progressive funneling and further shortening of her cervix.  Her cervix appeared closed by ultrasound but the cervical length was 10 mm.    She denies contractions change in discharge or bleeding.  Management options were discussed and she decided to proceed with cervical cerclage.    Review of History:      OB History:    OB History    Para Term  AB Living   1 0 0 0 0 0   SAB IAB Ectopic Multiple Live Births   0 0 0 0 0      # Outcome Date GA Lbr Dany/2nd Weight Sex Delivery Anes PTL Lv   1 Current                Other Relevant History:  Past Medical History:   Diagnosis Date    HPV in female     PCOS (polycystic ovarian syndrome)      History reviewed. No pertinent surgical history.  Family History   Problem Relation Age of Onset    No Known Problems Father     High Cholesterol Mother     Asthma Maternal Grandmother     Cancer Maternal Grandmother         lung smoker    No Known Problems Maternal Grandfather     Other (altzhirmer) Paternal Grandmother     Dementia Paternal Grandmother     Mental Disorder Sister     Other (Multiple sclerosis) Sister 22    No Known Problems Brother     Breast Cancer Neg     Colon Cancer Neg     Ovarian Cancer Neg       reports that she has never smoked. She has never been exposed to tobacco smoke. She has never used smokeless tobacco. She reports that she does not currently use alcohol. She reports that she does not  currently use drugs.    Allergies:  No Known Allergies    Medications:    Current Facility-Administered Medications:     lactated ringers infusion, , Intravenous, Continuous    metRONIDAZOLE in sodium chloride 0.79% (Flagyl) 5 mg/mL IVPB premix 500 mg, 500 mg, Intravenous, Q8H    Physical examination:  General: Alert, orientated x3.  Cooperative.  No apparent distress.  Vital Signs: /67   Pulse 82   Temp 98 °F (36.7 °C) (Oral)   Resp 18   Ht 5' 6\" (1.676 m)   Wt 206 lb (93.4 kg)   LMP 2023 (Exact Date)   BMI 33.25 kg/m²   HEENT: Exam is unremarkable.  Abdomen:  Gravid uterus appropriate for GA Nontender.  Extremities:  No lower extremity edema noted.  Skin: Normal texture and turgor.  Cervical exam was deferred to the OR      Laboratory Data:  Lab Results   Component Value Date    WBC 14.3 2024    HGB 12.4 2024    HCT 36.7 2024    .0 2024       NARRATIVE:   PREOPERATIVE COUNSELING  I reviewed that cerclage may help reduce the risk of a previable birth or reduce the risk or delay the onset of a  birth.  Cerclage placement is unlikely to reduce  birth which is the result of non-modifiable factors (e.g.: placental abruption, intra-amniotic infection).      I discussed the risks and benefits of cerclage placement  including PROM,  labor, bleeding, infection and although rare, maternal death.  I also reviewed that cerclage removal would be advised in the presence of active  labor, intra-amniotic infection, PROM or significant vaginal bleeding.  I discussed the need for continued monitoring for such  complications.     After counseling, she wishes to proceed with a cerclage.  Admission, and post admission procedures and expectations were discussed in detail.  All questions answered, all appropriate consents have been signed.      IMPRESSION:    21w2d  Short funneling cervix in the second trimester consistent with incompetent  cervix  Primigravida of advanced maternal age  Obesity complicating pregnancy    PLAN:   Placement of cervical cerclage with anticipated discharge home after recovery.  Vaginal progesterone nightly until the cerclage is removed.  Follow-up next week with maternal-fetal medicine for cervical length assessment    Thank you for allowing me to participate in the care of your patient.  Please do not hesitate to contact me if additional questions or concerns arise.  The majority of the time (>50%) was spent in review of records, consultation and coordination of care.  Our discussion is summarized above. The approximate physician face-to-face time was 35 minutes.      Mya Alejandra MD  2/1/2024  11:50 AM

## 2024-02-02 ENCOUNTER — TELEPHONE (OUTPATIENT)
Dept: OBGYN CLINIC | Facility: CLINIC | Age: 34
End: 2024-02-02

## 2024-02-02 PROBLEM — O34.32 CERVICAL CERCLAGE SUTURE PRESENT IN SECOND TRIMESTER (HCC): Status: ACTIVE | Noted: 2024-02-02

## 2024-02-02 PROBLEM — O34.32 CERVICAL CERCLAGE SUTURE PRESENT IN SECOND TRIMESTER: Status: ACTIVE | Noted: 2024-02-02

## 2024-02-02 NOTE — PROGRESS NOTES
FOCUS: MOM DISCHARGE    D: DISCHARGE ORDER RECEIVED FROM MD    A: DISCHARGE MEDICATION FORM REVIEWED, PATIENT INFORMED WHEN TO MAKE FOLLOW UP APPOINTMENT WITH OB    R:  VERBALIZED UNDERSTANDING OF FOLLOW UP INSTRUCTIONS.  DISCHARGED IN STABLE CONDITION

## 2024-02-02 NOTE — TELEPHONE ENCOUNTER
Received via fax request from [facility] [employer]  for Attending Physician statement .    Patient has been inform of  KANDACE is needed.   Patient understand and will fill out the form through My chart.    No payment has been colleted .

## 2024-02-08 ENCOUNTER — HOSPITAL ENCOUNTER (OUTPATIENT)
Dept: PERINATAL CARE | Facility: HOSPITAL | Age: 34
Discharge: HOME | End: 2024-02-08
Attending: OBSTETRICS & GYNECOLOGY
Payer: COMMERCIAL

## 2024-02-08 VITALS
HEART RATE: 90 BPM | DIASTOLIC BLOOD PRESSURE: 79 MMHG | WEIGHT: 206 LBS | SYSTOLIC BLOOD PRESSURE: 123 MMHG | BODY MASS INDEX: 33 KG/M2

## 2024-02-08 DIAGNOSIS — O09.512 PRIMIGRAVIDA OF ADVANCED MATERNAL AGE IN SECOND TRIMESTER: ICD-10-CM

## 2024-02-08 DIAGNOSIS — O26.872 SHORT CERVIX DURING PREGNANCY IN SECOND TRIMESTER: Chronic | ICD-10-CM

## 2024-02-08 DIAGNOSIS — O34.32 CERVICAL CERCLAGE SUTURE PRESENT IN SECOND TRIMESTER: ICD-10-CM

## 2024-02-08 DIAGNOSIS — O34.32 CERVICAL CERCLAGE SUTURE PRESENT IN SECOND TRIMESTER: Primary | ICD-10-CM

## 2024-02-08 PROCEDURE — 76817 TRANSVAGINAL US OBSTETRIC: CPT | Performed by: OBSTETRICS & GYNECOLOGY

## 2024-02-08 PROCEDURE — 76815 OB US LIMITED FETUS(S): CPT

## 2024-02-08 NOTE — PROGRESS NOTES
Outpatient Maternal-Fetal Medicine Follow-Up     Dear Dr. Palmer,     Thank you for requesting ultrasound evaluation and maternal fetal medicine consultation on your patient Martina Sheikh.  As you are aware she is a 33 year old female  with a Haile pregnancy and an Estimated Date of Delivery: 24.  She returned to maternal-fetal medicine today for a follow-up visit.  Her history was reviewed from her prior visit and there were no interval changes.     Antepartum Risk Factors  AMA  Short cervix in the second trimester; s/p rescue cerclage 24 (TRISHA)      S: She had brown thick mucus discharge on 24.  No leaking or active bleeding.  She has been using the nightly progesterone and has no complications with that.  She denies any cramping.    PHYSICAL EXAMINATION:  /79   Pulse 90   Wt 206 lb (93.4 kg)   LMP 2023 (Exact Date)   BMI 33.25 kg/m²   General: alert and oriented, no acute distress  Abdomen: gravid, soft, non-tender  Extremities: non-tender, no edema     OBSTETRIC ULTRASOUND  The patient had a limited and transvaginal ultrasound today which I interpreted the results and reviewed them with the patient.    Ultrasound Findings:    Single IUP in cephalic presentation.    Placenta is anterior, high.   A 3 vessel cord is noted.  Cardiac activity is present at 159 bpm  MVP is 4.3 cm  The cervix was not able to be clearly visualized and appeared short by transabdominal ultrasound, therefore transvaginal ultrasound was performed.   Transvaginal US findings: Cervix is short and funneling seen. Cervical length measured 20.9 mm Cerclage in place. Funneling: Funnel width without fundal pressure 18.0 mm. Funnel length without fundal pressure 16.0 mm    See imaging tab for the complete US report.     DISCUSSION  During her visit we discussed and reviewed the following issues:  ADVANCED MATERNAL AGE   morbidity and mortality associated with advancing maternal age was  discussed previously and reviewed.  See MFM note from 24 for detailed review.  I reviewed with the patient that pregnancies in women of advanced maternal age (35 or older at delivery) are associated with elevated risks. Specifically, there is a higher rate of:  Fetal malformations  Preeclampsia  Gestational diabetes  Intrauterine fetal death     As a result, enhanced pregnancy surveillance is advised for these patients including a comprehensive ultrasound to assess for fetal malformations (at 20 weeks) and a third trimester ultrasound assessment for fetal growth (at 32 weeks). In addition, weekly NST's (initiating at 36 weeks gestation for women 35-39 years and at 32 weeks gestation for women 40 years and older) are also advised. Routine obstetric care is more than adequate to assess for gestational diabetes and preeclampsia; hence, no further significant alterations in obstetric care are advised.        Prevention of Preeclampsia  Discussed previously and reviewed. See MFM note from 24 for detailed review.          SHORT CERVIX --   I discussed the above findings with the patient.  I reviewed the increased risk of  delivery associated with a short cervix and options of treatement.   See MFM note from 24 & 24 for detailed review.  Patient will continue on pelvic rest other than her nightly vaginal progesterone     We discussed the recommended plan of care based on her  risk factors.  Mia and her significant other, Joe, had their questions answered to their satisfaction.         IMPRESSION:  IUP at 22w2d   Short funneling cervix in the second trimester - s/p rescue cerclage on 24  AMA --NIPT low risk, declines invasive testing   Overweight       RECOMMENDATIONS:  Continue care with Dr. Palmer  Follow-up Growth ultrasound with BPP at 32 weeks.  Weekly NST's at 36 weeks.  Vaginal progesterone 200 mg nightly  Daily low-dose aspirin   Plan cerclage removal at 36  weeks        Total time spent 30 minutes this calendar day which includes preparing to see the patient including chart review, obtaining and/or reviewing additional medical history, performing a physical exam and evaluation, documenting clinical information in the electronic medical record, independently interpreting results, counseling the patient, communicating results to the patient/family/caregiver and coordinating care.     Case discussed with patient who demonstrated understanding and agreement with plan.     Thank you for allowing me to participate in the care of this patient.  Please feel free to contact me with any questions.    Mya Alejandra MD  Maternal-Fetal Medicine       Note to patient and family:  The 21st Century Cures Act makes medical notes available to patients in the interest of transparency.  However, please be advised that this is a medical document.  It is intended as a peer to peer communication.  It is written in medical language and may contain abbreviations or verbiage that are technical and unfamiliar.  It may appear blunt or direct.  Medical documents are intended to carry relevant information, facts as evident, and the clinical opinion of the practitioner.

## 2024-02-12 ENCOUNTER — TELEPHONE (OUTPATIENT)
Dept: PERINATAL CARE | Facility: HOSPITAL | Age: 34
End: 2024-02-12

## 2024-02-12 NOTE — TELEPHONE ENCOUNTER
----- Message from Martina Sheikh sent at 2/9/2024 12:40 PM CST -----  Regarding: Hr question   Contact: [phone]  Margie.  I did speak to HR today. They will approve for me to work 32 hour weeks. If you could please submit the paper that shows that I would appreciate it. Thank you again  Mia

## 2024-02-12 NOTE — TELEPHONE ENCOUNTER
Spoke with Martina regarding her request to submit a form to allow a 32 hour work week. Pt is going to contact her OB office regarding this issue and will have her HR department send the form to the OB.

## 2024-02-15 ENCOUNTER — TELEPHONE (OUTPATIENT)
Dept: OBGYN CLINIC | Facility: CLINIC | Age: 34
End: 2024-02-15

## 2024-02-15 NOTE — TELEPHONE ENCOUNTER
The patient called and wanted to know if the doctor could have paperwork filled out for her to work 32 hour weeks instead of 40. The patient had a stitch put in by  who told her that her OB doctor would be the one that would fill out the paperwork.

## 2024-02-28 ENCOUNTER — LAB ENCOUNTER (OUTPATIENT)
Dept: LAB | Facility: HOSPITAL | Age: 34
End: 2024-02-28
Attending: OBSTETRICS & GYNECOLOGY
Payer: COMMERCIAL

## 2024-02-28 ENCOUNTER — ROUTINE PRENATAL (OUTPATIENT)
Dept: OBGYN CLINIC | Facility: CLINIC | Age: 34
End: 2024-02-28
Payer: COMMERCIAL

## 2024-02-28 VITALS
DIASTOLIC BLOOD PRESSURE: 68 MMHG | HEIGHT: 66 IN | WEIGHT: 211 LBS | BODY MASS INDEX: 33.91 KG/M2 | SYSTOLIC BLOOD PRESSURE: 116 MMHG

## 2024-02-28 DIAGNOSIS — O34.32 CERVICAL CERCLAGE SUTURE PRESENT IN SECOND TRIMESTER (HCC): ICD-10-CM

## 2024-02-28 DIAGNOSIS — O26.872 SHORT CERVIX DURING PREGNANCY IN SECOND TRIMESTER (HCC): Chronic | ICD-10-CM

## 2024-02-28 DIAGNOSIS — Z34.02 ENCOUNTER FOR SUPERVISION OF NORMAL FIRST PREGNANCY IN SECOND TRIMESTER (HCC): ICD-10-CM

## 2024-02-28 DIAGNOSIS — O09.92 SUPERVISION OF HIGH RISK PREGNANCY IN SECOND TRIMESTER (HCC): Primary | ICD-10-CM

## 2024-02-28 LAB
BASOPHILS # BLD AUTO: 0.06 X10(3) UL (ref 0–0.2)
BASOPHILS NFR BLD AUTO: 0.5 %
DEPRECATED RDW RBC AUTO: 39.8 FL (ref 35.1–46.3)
EOSINOPHIL # BLD AUTO: 0.23 X10(3) UL (ref 0–0.7)
EOSINOPHIL NFR BLD AUTO: 1.9 %
ERYTHROCYTE [DISTWIDTH] IN BLOOD BY AUTOMATED COUNT: 12.6 % (ref 11–15)
GLUCOSE 1H P GLC SERPL-MCNC: 113 MG/DL
HCT VFR BLD AUTO: 36.3 %
HGB BLD-MCNC: 12.3 G/DL
IMM GRANULOCYTES # BLD AUTO: 0.19 X10(3) UL (ref 0–1)
IMM GRANULOCYTES NFR BLD: 1.6 %
LYMPHOCYTES # BLD AUTO: 1.78 X10(3) UL (ref 1–4)
LYMPHOCYTES NFR BLD AUTO: 14.6 %
MCH RBC QN AUTO: 29.5 PG (ref 26–34)
MCHC RBC AUTO-ENTMCNC: 33.9 G/DL (ref 31–37)
MCV RBC AUTO: 87.1 FL
MONOCYTES # BLD AUTO: 0.41 X10(3) UL (ref 0.1–1)
MONOCYTES NFR BLD AUTO: 3.4 %
NEUTROPHILS # BLD AUTO: 9.53 X10 (3) UL (ref 1.5–7.7)
NEUTROPHILS # BLD AUTO: 9.53 X10(3) UL (ref 1.5–7.7)
NEUTROPHILS NFR BLD AUTO: 78 %
PLATELET # BLD AUTO: 273 10(3)UL (ref 150–450)
RBC # BLD AUTO: 4.17 X10(6)UL
WBC # BLD AUTO: 12.2 X10(3) UL (ref 4–11)

## 2024-02-28 PROCEDURE — 3074F SYST BP LT 130 MM HG: CPT | Performed by: OBSTETRICS & GYNECOLOGY

## 2024-02-28 PROCEDURE — 3008F BODY MASS INDEX DOCD: CPT | Performed by: OBSTETRICS & GYNECOLOGY

## 2024-02-28 PROCEDURE — 82950 GLUCOSE TEST: CPT | Performed by: OBSTETRICS & GYNECOLOGY

## 2024-02-28 PROCEDURE — 85025 COMPLETE CBC W/AUTO DIFF WBC: CPT | Performed by: OBSTETRICS & GYNECOLOGY

## 2024-02-28 PROCEDURE — 3078F DIAST BP <80 MM HG: CPT | Performed by: OBSTETRICS & GYNECOLOGY

## 2024-02-28 NOTE — PROGRESS NOTES
Doing well. No OB complaints. +FM.   Reviewed cerclage insitu and current gestational age. Reviewed that if she can not work during this time I would recommend it until 28 weeks (3/25/24). I reviewed that this is a conservative approach and that she technically can work but this could potentially lead to  labor given her short cervix. Patient understood and agreed. Letter provided. Patient to obtain FMLA and short term disability paperwork to stop working now until 3/25/24 when she is 28 weeks.   Continues vaginal progesterone. No LOF but continuous discharge from progesterone.     Patient doing 1h GTT and CBC today.     DEMARCO 3 weeks.     Dr. Nilay Lopez MD    [facility]     This note was created by [employer] voice recognition. Errors in content may be related to improper recognition by the system; efforts to review and correct have been done but errors may still exist. Please be advised the primary purpose of this note is for me to communicate medical care. Standard sentence structure is not always used. Medical terminology and medical abbreviations may be used. There may be grammatical, typographical, and automated fill ins that may have errors missed in proofreading.

## 2024-03-19 ENCOUNTER — ROUTINE PRENATAL (OUTPATIENT)
Dept: OBGYN CLINIC | Facility: CLINIC | Age: 34
End: 2024-03-19
Payer: COMMERCIAL

## 2024-03-19 VITALS
SYSTOLIC BLOOD PRESSURE: 118 MMHG | WEIGHT: 216.63 LBS | DIASTOLIC BLOOD PRESSURE: 68 MMHG | HEIGHT: 66 IN | BODY MASS INDEX: 34.81 KG/M2

## 2024-03-19 DIAGNOSIS — O34.32 CERVICAL CERCLAGE SUTURE PRESENT IN SECOND TRIMESTER (HCC): ICD-10-CM

## 2024-03-19 DIAGNOSIS — Z36.9 UNSPECIFIED ANTENATAL SCREENING (HCC): Primary | ICD-10-CM

## 2024-03-19 PROCEDURE — 3074F SYST BP LT 130 MM HG: CPT | Performed by: OBSTETRICS & GYNECOLOGY

## 2024-03-19 PROCEDURE — 3008F BODY MASS INDEX DOCD: CPT | Performed by: OBSTETRICS & GYNECOLOGY

## 2024-03-19 PROCEDURE — 3078F DIAST BP <80 MM HG: CPT | Performed by: OBSTETRICS & GYNECOLOGY

## 2024-03-19 NOTE — PROGRESS NOTES
Doing well. No OB complaints. +FM.   TDAP next visit.   Desires to continue off work until 36 weeks due to symptoms of pelvic pressure and vaginal cerclage present. Letter provided.     DEMARCO 2 weeks.     Dr. Nilay Lopez MD    [facility]     This note was created by Dragon voice recognition. Errors in content may be related to improper recognition by the system; efforts to review and correct have been done but errors may still exist. Please be advised the primary purpose of this note is for me to communicate medical care. Standard sentence structure is not always used. Medical terminology and medical abbreviations may be used. There may be grammatical, typographical, and automated fill ins that may have errors missed in proofreading.

## 2024-04-02 ENCOUNTER — PATIENT MESSAGE (OUTPATIENT)
Dept: OBGYN CLINIC | Facility: CLINIC | Age: 34
End: 2024-04-02

## 2024-04-02 NOTE — TELEPHONE ENCOUNTER
From: Martina Sheikh  To: Nilay Lopez  Sent: 4/2/2024 1:26 PM CDT  Subject: My extended pregnancy leave    Hello,  I got a phone call from my [facility] group. They are calling because they need some medical paperwork from you to be able to extend my leave at work so that I can get paid for it. Please let me know if I can help in anyway.     Thank you

## 2024-04-08 ENCOUNTER — ROUTINE PRENATAL (OUTPATIENT)
Dept: OBGYN CLINIC | Facility: CLINIC | Age: 34
End: 2024-04-08
Payer: COMMERCIAL

## 2024-04-08 VITALS
DIASTOLIC BLOOD PRESSURE: 70 MMHG | BODY MASS INDEX: 35.78 KG/M2 | WEIGHT: 222.63 LBS | SYSTOLIC BLOOD PRESSURE: 118 MMHG | HEIGHT: 66 IN

## 2024-04-08 DIAGNOSIS — O09.93 SUPERVISION OF HIGH RISK PREGNANCY IN THIRD TRIMESTER (HCC): Primary | ICD-10-CM

## 2024-04-08 PROBLEM — Z34.90 PREGNANCY (HCC): Chronic | Status: ACTIVE | Noted: 2024-02-01

## 2024-04-08 PROBLEM — O09.519 AMA (ADVANCED MATERNAL AGE) PRIMIGRAVIDA 35+ (HCC): Chronic | Status: ACTIVE | Noted: 2023-11-21

## 2024-04-08 PROBLEM — O34.32 CERVICAL CERCLAGE SUTURE PRESENT IN SECOND TRIMESTER (HCC): Chronic | Status: ACTIVE | Noted: 2024-02-02

## 2024-04-08 NOTE — ADDENDUM NOTE
Addended by: JEAN MONTIEL on: 4/8/2024 01:45 PM     Modules accepted: Orders     Off unit AMA via private auto with family. Dr Mayfield aware and AMA papers signed

## 2024-04-18 ENCOUNTER — HOSPITAL ENCOUNTER (OUTPATIENT)
Dept: PERINATAL CARE | Facility: HOSPITAL | Age: 34
Discharge: HOME | End: 2024-04-18
Attending: OBSTETRICS & GYNECOLOGY
Payer: COMMERCIAL

## 2024-04-18 VITALS
SYSTOLIC BLOOD PRESSURE: 119 MMHG | HEART RATE: 101 BPM | WEIGHT: 222 LBS | BODY MASS INDEX: 36 KG/M2 | DIASTOLIC BLOOD PRESSURE: 78 MMHG

## 2024-04-18 DIAGNOSIS — O34.33 CERVICAL CERCLAGE SUTURE PRESENT IN THIRD TRIMESTER (HCC): ICD-10-CM

## 2024-04-18 DIAGNOSIS — O09.513 PRIMIGRAVIDA OF ADVANCED MATERNAL AGE IN THIRD TRIMESTER (HCC): Primary | Chronic | ICD-10-CM

## 2024-04-18 DIAGNOSIS — O09.513 PRIMIGRAVIDA OF ADVANCED MATERNAL AGE IN THIRD TRIMESTER (HCC): Chronic | ICD-10-CM

## 2024-04-18 DIAGNOSIS — O26.872 SHORT CERVIX DURING PREGNANCY IN SECOND TRIMESTER (HCC): ICD-10-CM

## 2024-04-18 PROCEDURE — 76819 FETAL BIOPHYS PROFIL W/O NST: CPT

## 2024-04-18 PROCEDURE — 76816 OB US FOLLOW-UP PER FETUS: CPT | Performed by: OBSTETRICS & GYNECOLOGY

## 2024-04-18 NOTE — PROGRESS NOTES
Outpatient Maternal-Fetal Medicine Follow-Up     Dear Dr. Palmer,     Thank you for requesting ultrasound evaluation and maternal fetal medicine consultation on your patient Martina Sheikh.  As you are aware she is a 33 year old female  with a Haile pregnancy and an Estimated Date of Delivery: 24.  She returned to maternal-fetal medicine today for a follow-up visit.  Her history was reviewed from her prior visit and there were no interval changes.     Antepartum Risk Factors  AMA  Short cervix in the second trimester; s/p rescue cerclage 24 (TRISHA)      S: She reports good fetal movement.  No contractions.  She does notice after activity she is feeling more pressure in her lower abdomen but it resolves with rest.    She will see about having the cerclage removed at her OB office at a routine visit.    PHYSICAL EXAMINATION:  /78   Pulse 101   Wt 222 lb (100.7 kg)   LMP 2023 (Exact Date)   BMI 35.83 kg/m²   General: alert and oriented, no acute distress  Abdomen: gravid, soft, non-tender  Extremities: non-tender, no edema     OBSTETRIC ULTRASOUND  The patient had a fetal growth and BPP ultrasound today which I interpreted the results and reviewed them with the patient.    Ultrasound Findings:    Single IUP in cephalic presentation.    Placenta is anterior.   A 3 vessel cord is noted.  Cardiac activity is present at 155 bpm  EFW 2160 g ( 4 lb 12 oz); 62%.    HELLEN is  13.8 cm.  MVP is 4.3 cm  BPP is 8/8.     The fetal measurements are consistent with established EDC. No gross ultrasound evidence of structural abnormalities are seen today. The patient understands that ultrasound cannot rule out all structural and chromosomal abnormalities.     See imaging tab for the complete US report.     DISCUSSION  During her visit we discussed and reviewed the following issues:  ADVANCED MATERNAL AGE   morbidity and mortality associated with advancing maternal age was discussed  previously and reviewed.  See MFM note from 24 for detailed review.  I reviewed with the patient that pregnancies in women of advanced maternal age (35 or older at delivery) are associated with elevated risks. Specifically, there is a higher rate of:  Fetal malformations  Preeclampsia  Gestational diabetes  Intrauterine fetal death     As a result, enhanced pregnancy surveillance is advised for these patients including a comprehensive ultrasound to assess for fetal malformations (at 20 weeks) and a third trimester ultrasound assessment for fetal growth (at 32 weeks). In addition, weekly NST's (initiating at 36 weeks gestation for women 35-39 years and at 32 weeks gestation for women 40 years and older) are also advised. Routine obstetric care is more than adequate to assess for gestational diabetes and preeclampsia; hence, no further significant alterations in obstetric care are advised.        Prevention of Preeclampsia  Discussed previously and reviewed. See MFM note from 24 for detailed review.          SHORT CERVIX --   I discussed the above findings with the patient.  I reviewed the increased risk of  delivery associated with a short cervix and options of treatement.   See MFM note from 24 & 24 for detailed review.  Patient will continue on pelvic rest other than her nightly vaginal progesterone    We reviewed the signs and symptoms of preeclampsia,  labor and monitoring fetal movement.  We discussed reasons for her to call her physician.       We discussed the recommended plan of care based on her  risk factors.  Mia and her significant other, Joe, had their questions answered to their satisfaction.         IMPRESSION:  IUP at 32w2d   Normal fetal growth and  testing  Short funneling cervix in the second trimester - s/p rescue cerclage on 24  AMA --NIPT low risk, declines invasive testing   Overweight       RECOMMENDATIONS:  Continue care with   Chorzempa-Schainis  Weekly NST's at 36 weeks.  Vaginal progesterone 200 mg nightly until the cerclage is removed  Daily low-dose aspirin   Plan cerclage removal at 36 weeks        Total time spent 30 minutes this calendar day which includes preparing to see the patient including chart review, obtaining and/or reviewing additional medical history, performing a physical exam and evaluation, documenting clinical information in the electronic medical record, independently interpreting results, counseling the patient, communicating results to the patient/family/caregiver and coordinating care.     Case discussed with patient who demonstrated understanding and agreement with plan.     Thank you for allowing me to participate in the care of this patient.  Please feel free to contact me with any questions.    Mya Alejandra MD  Maternal-Fetal Medicine       Note to patient and family:  The 21st Century Cures Act makes medical notes available to patients in the interest of transparency.  However, please be advised that this is a medical document.  It is intended as a peer to peer communication.  It is written in medical language and may contain abbreviations or verbiage that are technical and unfamiliar.  It may appear blunt or direct.  Medical documents are intended to carry relevant information, facts as evident, and the clinical opinion of the practitioner.

## 2024-04-24 ENCOUNTER — ROUTINE PRENATAL (OUTPATIENT)
Dept: OBGYN CLINIC | Facility: CLINIC | Age: 34
End: 2024-04-24
Payer: COMMERCIAL

## 2024-04-24 VITALS
BODY MASS INDEX: 36 KG/M2 | SYSTOLIC BLOOD PRESSURE: 120 MMHG | WEIGHT: 224 LBS | DIASTOLIC BLOOD PRESSURE: 72 MMHG | HEIGHT: 66 IN

## 2024-04-24 DIAGNOSIS — O09.93 SUPERVISION OF HIGH RISK PREGNANCY IN THIRD TRIMESTER (HCC): Primary | ICD-10-CM

## 2024-04-24 PROCEDURE — 3074F SYST BP LT 130 MM HG: CPT | Performed by: OBSTETRICS & GYNECOLOGY

## 2024-04-24 PROCEDURE — 3008F BODY MASS INDEX DOCD: CPT | Performed by: OBSTETRICS & GYNECOLOGY

## 2024-04-24 PROCEDURE — 3078F DIAST BP <80 MM HG: CPT | Performed by: OBSTETRICS & GYNECOLOGY

## 2024-04-24 NOTE — PROGRESS NOTES
Denies pain, bleeding, LOF. Positive fetal movement      MFM ultrasound 23:  EFW 2160 g ( 4 lb 12 oz); 62%., cephalic    33 year old  at 33w1d by 7/3 wk ultrasound.      Return OB  Pre- Care: UTD.     Patient Active Problem List   Diagnosis    neg cfDNA  pregnancy    AMA (advanced maternal age) primigravida 35+ ([facility])    Short cervix during pregnancy in second trimester ([facility])    Pregnancy ([facility])    Cervical cerclage suture present in second trimester ([facility])     Cerclage removal at 36 weeks (can be done in the office) and continue vaginal progesterone.    - Return to clinic in: 2 weeks

## 2024-05-08 ENCOUNTER — ROUTINE PRENATAL (OUTPATIENT)
Dept: OBGYN CLINIC | Facility: CLINIC | Age: 34
End: 2024-05-08
Payer: COMMERCIAL

## 2024-05-08 VITALS
WEIGHT: 227 LBS | SYSTOLIC BLOOD PRESSURE: 120 MMHG | BODY MASS INDEX: 36.48 KG/M2 | HEIGHT: 66 IN | DIASTOLIC BLOOD PRESSURE: 70 MMHG

## 2024-05-08 DIAGNOSIS — N89.8 VAGINAL ITCHING: ICD-10-CM

## 2024-05-08 DIAGNOSIS — Z36.9 UNSPECIFIED ANTENATAL SCREENING (HCC): Primary | ICD-10-CM

## 2024-05-08 PROCEDURE — 81514 NFCT DS BV&VAGINITIS DNA ALG: CPT | Performed by: OBSTETRICS & GYNECOLOGY

## 2024-05-08 PROCEDURE — 3074F SYST BP LT 130 MM HG: CPT | Performed by: OBSTETRICS & GYNECOLOGY

## 2024-05-08 PROCEDURE — 3008F BODY MASS INDEX DOCD: CPT | Performed by: OBSTETRICS & GYNECOLOGY

## 2024-05-08 PROCEDURE — 3078F DIAST BP <80 MM HG: CPT | Performed by: OBSTETRICS & GYNECOLOGY

## 2024-05-08 NOTE — PROGRESS NOTES
Doing well. No OB complaints. +FM.   Noted a change in vaginal progesterone and having more irritation.   Vaginitis swab collected. Prescription if positive.  Cerclage removal next week.   3rd trimester labs ordered.   GBS next week with cerclage removal.     DEMARCO 1 week with NST.     Dr. Nilay Lopez MD    [facility]     This note was created by [employer] voice recognition. Errors in content may be related to improper recognition by the system; efforts to review and correct have been done but errors may still exist. Please be advised the primary purpose of this note is for me to communicate medical care. Standard sentence structure is not always used. Medical terminology and medical abbreviations may be used. There may be grammatical, typographical, and automated fill ins that may have errors missed in proofreading.

## 2024-05-09 LAB
BV BACTERIA DNA VAG QL NAA+PROBE: NEGATIVE
C GLABRATA DNA VAG QL NAA+PROBE: NEGATIVE
C KRUSEI DNA VAG QL NAA+PROBE: NEGATIVE
CANDIDA DNA VAG QL NAA+PROBE: NEGATIVE
T VAGINALIS DNA VAG QL NAA+PROBE: NEGATIVE

## 2024-05-13 ENCOUNTER — ROUTINE PRENATAL (OUTPATIENT)
Dept: OBGYN CLINIC | Facility: CLINIC | Age: 34
End: 2024-05-13
Payer: COMMERCIAL

## 2024-05-13 ENCOUNTER — LAB ENCOUNTER (OUTPATIENT)
Dept: LAB | Facility: HOSPITAL | Age: 34
End: 2024-05-13
Attending: OBSTETRICS & GYNECOLOGY

## 2024-05-13 VITALS
WEIGHT: 229.63 LBS | HEIGHT: 66 IN | DIASTOLIC BLOOD PRESSURE: 68 MMHG | BODY MASS INDEX: 36.9 KG/M2 | SYSTOLIC BLOOD PRESSURE: 114 MMHG

## 2024-05-13 DIAGNOSIS — L29.9 ITCHING: ICD-10-CM

## 2024-05-13 DIAGNOSIS — Z36.9 UNSPECIFIED ANTENATAL SCREENING (HCC): ICD-10-CM

## 2024-05-13 DIAGNOSIS — O26.872 SHORT CERVIX DURING PREGNANCY IN SECOND TRIMESTER (HCC): ICD-10-CM

## 2024-05-13 DIAGNOSIS — O34.32 CERVICAL CERCLAGE SUTURE PRESENT IN SECOND TRIMESTER (HCC): Chronic | ICD-10-CM

## 2024-05-13 DIAGNOSIS — O09.93 SUPERVISION OF HIGH RISK PREGNANCY IN THIRD TRIMESTER (HCC): Primary | ICD-10-CM

## 2024-05-13 LAB
BASOPHILS # BLD AUTO: 0.09 X10(3) UL (ref 0–0.2)
BASOPHILS NFR BLD AUTO: 0.6 %
DEPRECATED RDW RBC AUTO: 38.7 FL (ref 35.1–46.3)
EOSINOPHIL # BLD AUTO: 0.76 X10(3) UL (ref 0–0.7)
EOSINOPHIL NFR BLD AUTO: 5.3 %
ERYTHROCYTE [DISTWIDTH] IN BLOOD BY AUTOMATED COUNT: 12.6 % (ref 11–15)
HCT VFR BLD AUTO: 35.9 %
HGB BLD-MCNC: 12.4 G/DL
IMM GRANULOCYTES # BLD AUTO: 0.32 X10(3) UL (ref 0–1)
IMM GRANULOCYTES NFR BLD: 2.2 %
LYMPHOCYTES # BLD AUTO: 2.4 X10(3) UL (ref 1–4)
LYMPHOCYTES NFR BLD AUTO: 16.8 %
MCH RBC QN AUTO: 29.5 PG (ref 26–34)
MCHC RBC AUTO-ENTMCNC: 34.5 G/DL (ref 31–37)
MCV RBC AUTO: 85.5 FL
MONOCYTES # BLD AUTO: 0.73 X10(3) UL (ref 0.1–1)
MONOCYTES NFR BLD AUTO: 5.1 %
NEUTROPHILS # BLD AUTO: 9.96 X10 (3) UL (ref 1.5–7.7)
NEUTROPHILS # BLD AUTO: 9.96 X10(3) UL (ref 1.5–7.7)
NEUTROPHILS NFR BLD AUTO: 70 %
PLATELET # BLD AUTO: 241 10(3)UL (ref 150–450)
RBC # BLD AUTO: 4.2 X10(6)UL
T PALLIDUM AB SER QL IA: NONREACTIVE
WBC # BLD AUTO: 14.3 X10(3) UL (ref 4–11)

## 2024-05-13 PROCEDURE — 82239 BILE ACIDS TOTAL: CPT | Performed by: OBSTETRICS & GYNECOLOGY

## 2024-05-13 PROCEDURE — 85025 COMPLETE CBC W/AUTO DIFF WBC: CPT | Performed by: OBSTETRICS & GYNECOLOGY

## 2024-05-13 PROCEDURE — 87081 CULTURE SCREEN ONLY: CPT | Performed by: OBSTETRICS & GYNECOLOGY

## 2024-05-13 PROCEDURE — 86780 TREPONEMA PALLIDUM: CPT | Performed by: OBSTETRICS & GYNECOLOGY

## 2024-05-13 PROCEDURE — 87389 HIV-1 AG W/HIV-1&-2 AB AG IA: CPT | Performed by: OBSTETRICS & GYNECOLOGY

## 2024-05-13 PROCEDURE — 87150 DNA/RNA AMPLIFIED PROBE: CPT | Performed by: OBSTETRICS & GYNECOLOGY

## 2024-05-13 RX ORDER — URSODIOL 300 MG/1
300 CAPSULE ORAL 3 TIMES DAILY
Qty: 270 CAPSULE | Refills: 0 | Status: SHIPPED | OUTPATIENT
Start: 2024-05-13 | End: 2024-08-11

## 2024-05-13 NOTE — PROGRESS NOTES
Doing well. No OB complaints. +FM.   Cerclage removed intact. Palpated cervix and palpated without suture. SVE 1/75/-3.   Bedside ultrasound cephalic grossly normal HELLEN.   Noted itching all over. Recommend checking bile acids. Ursodiol TID prescribed. Reviewed early induction of labor if elevated. As needed Benadryl.   Labs today.     DEMARCO 1 weeks.     Dr. Nilay Lopez MD    [facility]     This note was created by [employer] voice recognition. Errors in content may be related to improper recognition by the system; efforts to review and correct have been done but errors may still exist. Please be advised the primary purpose of this note is for me to communicate medical care. Standard sentence structure is not always used. Medical terminology and medical abbreviations may be used. There may be grammatical, typographical, and automated fill ins that may have errors missed in proofreading.

## 2024-05-14 LAB
BILE ACIDS: 3.2 UMOL/L
GROUP B STREP BY PCR FOR PCR OVT: POSITIVE

## 2024-05-20 ENCOUNTER — ROUTINE PRENATAL (OUTPATIENT)
Dept: OBGYN CLINIC | Facility: CLINIC | Age: 34
End: 2024-05-20

## 2024-05-20 VITALS
DIASTOLIC BLOOD PRESSURE: 74 MMHG | BODY MASS INDEX: 37.39 KG/M2 | HEIGHT: 66 IN | WEIGHT: 232.63 LBS | SYSTOLIC BLOOD PRESSURE: 120 MMHG

## 2024-05-20 DIAGNOSIS — O09.513 PRIMIGRAVIDA OF ADVANCED MATERNAL AGE IN THIRD TRIMESTER (HCC): Primary | Chronic | ICD-10-CM

## 2024-05-20 PROBLEM — O99.820 GROUP B STREPTOCOCCUS CARRIER, ANTEPARTUM (HCC): Chronic | Status: ACTIVE | Noted: 2024-05-20

## 2024-05-20 PROBLEM — O99.820 GROUP B STREPTOCOCCUS CARRIER, ANTEPARTUM (HCC): Status: ACTIVE | Noted: 2024-05-20

## 2024-05-20 PROBLEM — O34.32 CERVICAL CERCLAGE SUTURE PRESENT IN SECOND TRIMESTER (HCC): Chronic | Status: RESOLVED | Noted: 2024-02-02 | Resolved: 2024-05-20

## 2024-05-29 ENCOUNTER — ROUTINE PRENATAL (OUTPATIENT)
Dept: OBGYN CLINIC | Facility: CLINIC | Age: 34
End: 2024-05-29

## 2024-05-29 VITALS
HEIGHT: 66 IN | SYSTOLIC BLOOD PRESSURE: 118 MMHG | WEIGHT: 236 LBS | DIASTOLIC BLOOD PRESSURE: 68 MMHG | BODY MASS INDEX: 37.93 KG/M2

## 2024-05-29 DIAGNOSIS — O09.513 PRIMIGRAVIDA OF ADVANCED MATERNAL AGE IN THIRD TRIMESTER (HCC): Primary | ICD-10-CM

## 2024-05-29 PROCEDURE — 3078F DIAST BP <80 MM HG: CPT | Performed by: OBSTETRICS & GYNECOLOGY

## 2024-05-29 PROCEDURE — 3074F SYST BP LT 130 MM HG: CPT | Performed by: OBSTETRICS & GYNECOLOGY

## 2024-05-29 PROCEDURE — 3008F BODY MASS INDEX DOCD: CPT | Performed by: OBSTETRICS & GYNECOLOGY

## 2024-05-29 PROCEDURE — 59025 FETAL NON-STRESS TEST: CPT | Performed by: OBSTETRICS & GYNECOLOGY

## 2024-05-29 NOTE — PROGRESS NOTES
Denies pain, bleeding, LOF. Positive fetal movement      MFM ultrasound 23:  EFW 2160 g ( 4 lb 12 oz); 62%., cephalic    Reactive NST todat    33 year old  at 38w1d by 7/3 wk ultrasound.      Return OB  Pre- Care: UTD.     Patient Active Problem List   Diagnosis    neg cfDNA  pregnancy    AMA (advanced maternal age) primigravida 35+ ([facility])    Short cervix during pregnancy in second trimester ([facility])    Pregnancy ([facility])    Group B Streptococcus carrier, antepartum ([facility])        - Return to clinic in: 1 weeks

## 2024-05-31 RX ORDER — FAMOTIDINE 20 MG/1
20 TABLET, FILM COATED ORAL 2 TIMES DAILY
Qty: 180 TABLET | Refills: 0 | Status: SHIPPED | OUTPATIENT
Start: 2024-05-31

## 2024-06-05 ENCOUNTER — ROUTINE PRENATAL (OUTPATIENT)
Dept: OBGYN CLINIC | Facility: CLINIC | Age: 34
End: 2024-06-05
Payer: COMMERCIAL

## 2024-06-05 VITALS
SYSTOLIC BLOOD PRESSURE: 120 MMHG | BODY MASS INDEX: 38.06 KG/M2 | WEIGHT: 236.81 LBS | DIASTOLIC BLOOD PRESSURE: 72 MMHG | HEIGHT: 66 IN

## 2024-06-05 DIAGNOSIS — Z36.9 UNSPECIFIED ANTENATAL SCREENING (HCC): Primary | ICD-10-CM

## 2024-06-05 PROCEDURE — 59025 FETAL NON-STRESS TEST: CPT | Performed by: OBSTETRICS & GYNECOLOGY

## 2024-06-05 PROCEDURE — 3074F SYST BP LT 130 MM HG: CPT | Performed by: OBSTETRICS & GYNECOLOGY

## 2024-06-05 PROCEDURE — 3008F BODY MASS INDEX DOCD: CPT | Performed by: OBSTETRICS & GYNECOLOGY

## 2024-06-05 PROCEDURE — 3078F DIAST BP <80 MM HG: CPT | Performed by: OBSTETRICS & GYNECOLOGY

## 2024-06-05 NOTE — PROGRESS NOTES
Doing well. No OB complaints. +FM.   NST reactive.   Declined SVE with membranes sweeping. Desires next week.     DEMARCO 1 weeks.     Dr. Nilay Lopez MD    [facility]     This note was created by [employer] voice recognition. Errors in content may be related to improper recognition by the system; efforts to review and correct have been done but errors may still exist. Please be advised the primary purpose of this note is for me to communicate medical care. Standard sentence structure is not always used. Medical terminology and medical abbreviations may be used. There may be grammatical, typographical, and automated fill ins that may have errors missed in proofreading.

## 2024-06-10 ENCOUNTER — ANESTHESIA EVENT (OUTPATIENT)
Dept: OBGYN UNIT | Facility: HOSPITAL | Age: 34
End: 2024-06-10
Payer: COMMERCIAL

## 2024-06-10 ENCOUNTER — HOSPITAL ENCOUNTER (INPATIENT)
Facility: HOSPITAL | Age: 34
LOS: 2 days | Discharge: HOME | End: 2024-06-12
Attending: OBSTETRICS & GYNECOLOGY | Admitting: OBSTETRICS & GYNECOLOGY
Payer: COMMERCIAL

## 2024-06-10 ENCOUNTER — ANESTHESIA (OUTPATIENT)
Dept: OBGYN UNIT | Facility: HOSPITAL | Age: 34
End: 2024-06-10
Payer: COMMERCIAL

## 2024-06-10 LAB
ANTIBODY SCREEN: NEGATIVE
APTT PPP: 25.5 SECONDS (ref 23–36)
BASOPHILS # BLD AUTO: 0.07 X10(3) UL (ref 0–0.2)
BASOPHILS NFR BLD AUTO: 0.4 %
D DIMER PPP FEU-MCNC: 3.1 UG/ML FEU (ref ?–0.5)
DEPRECATED RDW RBC AUTO: 39 FL (ref 35.1–46.3)
DEPRECATED RDW RBC AUTO: 40.9 FL (ref 35.1–46.3)
EOSINOPHIL # BLD AUTO: 0.17 X10(3) UL (ref 0–0.7)
EOSINOPHIL NFR BLD AUTO: 0.9 %
ERYTHROCYTE [DISTWIDTH] IN BLOOD BY AUTOMATED COUNT: 12.6 % (ref 11–15)
ERYTHROCYTE [DISTWIDTH] IN BLOOD BY AUTOMATED COUNT: 12.8 % (ref 11–15)
HCT VFR BLD AUTO: 32.7 %
HCT VFR BLD AUTO: 35 %
HGB BLD-MCNC: 10.9 G/DL
HGB BLD-MCNC: 12.2 G/DL
IMM GRANULOCYTES # BLD AUTO: 0.16 X10(3) UL (ref 0–1)
IMM GRANULOCYTES NFR BLD: 0.9 %
INR BLD: 0.96 (ref 0.8–1.2)
LYMPHOCYTES # BLD AUTO: 2.04 X10(3) UL (ref 1–4)
LYMPHOCYTES NFR BLD AUTO: 10.9 %
MCH RBC QN AUTO: 29.3 PG (ref 26–34)
MCH RBC QN AUTO: 29.9 PG (ref 26–34)
MCHC RBC AUTO-ENTMCNC: 33.3 G/DL (ref 31–37)
MCHC RBC AUTO-ENTMCNC: 34.9 G/DL (ref 31–37)
MCV RBC AUTO: 85.8 FL
MCV RBC AUTO: 87.9 FL
MONOCYTES # BLD AUTO: 0.79 X10(3) UL (ref 0.1–1)
MONOCYTES NFR BLD AUTO: 4.2 %
NEUTROPHILS # BLD AUTO: 15.44 X10 (3) UL (ref 1.5–7.7)
NEUTROPHILS # BLD AUTO: 15.44 X10(3) UL (ref 1.5–7.7)
NEUTROPHILS NFR BLD AUTO: 82.7 %
PLATELET # BLD AUTO: 215 10(3)UL (ref 150–450)
PLATELET # BLD AUTO: 215 10(3)UL (ref 150–450)
PLATELET # BLD AUTO: 220 10(3)UL (ref 150–450)
PROTHROMBIN TIME: 13.4 SECONDS (ref 11.6–14.8)
RBC # BLD AUTO: 3.72 X10(6)UL
RBC # BLD AUTO: 4.08 X10(6)UL
RH BLOOD TYPE: POSITIVE
T PALLIDUM AB SER QL IA: NONREACTIVE
WBC # BLD AUTO: 18.7 X10(3) UL (ref 4–11)
WBC # BLD AUTO: 24 X10(3) UL (ref 4–11)

## 2024-06-10 PROCEDURE — 85384 FIBRINOGEN ACTIVITY: CPT | Performed by: OBSTETRICS & GYNECOLOGY

## 2024-06-10 PROCEDURE — 86901 BLOOD TYPING SEROLOGIC RH(D): CPT | Performed by: OBSTETRICS & GYNECOLOGY

## 2024-06-10 PROCEDURE — 86850 RBC ANTIBODY SCREEN: CPT | Performed by: OBSTETRICS & GYNECOLOGY

## 2024-06-10 PROCEDURE — 85379 FIBRIN DEGRADATION QUANT: CPT | Performed by: OBSTETRICS & GYNECOLOGY

## 2024-06-10 PROCEDURE — 85025 COMPLETE CBC W/AUTO DIFF WBC: CPT | Performed by: OBSTETRICS & GYNECOLOGY

## 2024-06-10 PROCEDURE — 85027 COMPLETE CBC AUTOMATED: CPT | Performed by: OBSTETRICS & GYNECOLOGY

## 2024-06-10 PROCEDURE — 85730 THROMBOPLASTIN TIME PARTIAL: CPT | Performed by: OBSTETRICS & GYNECOLOGY

## 2024-06-10 PROCEDURE — 85610 PROTHROMBIN TIME: CPT | Performed by: OBSTETRICS & GYNECOLOGY

## 2024-06-10 PROCEDURE — 86900 BLOOD TYPING SEROLOGIC ABO: CPT | Performed by: OBSTETRICS & GYNECOLOGY

## 2024-06-10 PROCEDURE — 85049 AUTOMATED PLATELET COUNT: CPT | Performed by: OBSTETRICS & GYNECOLOGY

## 2024-06-10 PROCEDURE — 0W3R7ZZ CONTROL BLEEDING IN GENITOURINARY TRACT, VIA NATURAL OR ARTIFICIAL OPENING: ICD-10-PCS | Performed by: OBSTETRICS & GYNECOLOGY

## 2024-06-10 PROCEDURE — 10907ZC DRAINAGE OF AMNIOTIC FLUID, THERAPEUTIC FROM PRODUCTS OF CONCEPTION, VIA NATURAL OR ARTIFICIAL OPENING: ICD-10-PCS | Performed by: OBSTETRICS & GYNECOLOGY

## 2024-06-10 PROCEDURE — 0KQM0ZZ REPAIR PERINEUM MUSCLE, OPEN APPROACH: ICD-10-PCS | Performed by: OBSTETRICS & GYNECOLOGY

## 2024-06-10 PROCEDURE — 99214 OFFICE O/P EST MOD 30 MIN: CPT

## 2024-06-10 PROCEDURE — 86780 TREPONEMA PALLIDUM: CPT | Performed by: OBSTETRICS & GYNECOLOGY

## 2024-06-10 RX ORDER — NALBUPHINE HYDROCHLORIDE 10 MG/ML
2.5 INJECTION, SOLUTION INTRAMUSCULAR; INTRAVENOUS; SUBCUTANEOUS
Status: DISCONTINUED | OUTPATIENT
Start: 2024-06-10 | End: 2024-06-10

## 2024-06-10 RX ORDER — SODIUM CHLORIDE, SODIUM LACTATE, POTASSIUM CHLORIDE, CALCIUM CHLORIDE 600; 310; 30; 20 MG/100ML; MG/100ML; MG/100ML; MG/100ML
INJECTION, SOLUTION INTRAVENOUS CONTINUOUS
Status: DISCONTINUED | OUTPATIENT
Start: 2024-06-10 | End: 2024-06-10

## 2024-06-10 RX ORDER — CARBOPROST TROMETHAMINE 250 UG/ML
INJECTION, SOLUTION INTRAMUSCULAR
Status: DISCONTINUED
Start: 2024-06-10 | End: 2024-06-10 | Stop reason: WASHOUT

## 2024-06-10 RX ORDER — ACETAMINOPHEN 500 MG
500 TABLET ORAL EVERY 6 HOURS PRN
Status: DISCONTINUED | OUTPATIENT
Start: 2024-06-10 | End: 2024-06-10

## 2024-06-10 RX ORDER — TERBUTALINE SULFATE 1 MG/ML
0.25 INJECTION, SOLUTION SUBCUTANEOUS AS NEEDED
Status: DISCONTINUED | OUTPATIENT
Start: 2024-06-10 | End: 2024-06-10

## 2024-06-10 RX ORDER — AMMONIA INHALANTS 0.04 G/.3ML
0.3 INHALANT RESPIRATORY (INHALATION) AS NEEDED
Status: DISCONTINUED | OUTPATIENT
Start: 2024-06-10 | End: 2024-06-12

## 2024-06-10 RX ORDER — ACETAMINOPHEN 500 MG
500 TABLET ORAL EVERY 6 HOURS PRN
Status: DISCONTINUED | OUTPATIENT
Start: 2024-06-10 | End: 2024-06-12

## 2024-06-10 RX ORDER — IBUPROFEN 600 MG/1
600 TABLET ORAL ONCE AS NEEDED
Status: DISCONTINUED | OUTPATIENT
Start: 2024-06-10 | End: 2024-06-10

## 2024-06-10 RX ORDER — TRANEXAMIC ACID 10 MG/ML
INJECTION, SOLUTION INTRAVENOUS
Status: COMPLETED
Start: 2024-06-10 | End: 2024-06-10

## 2024-06-10 RX ORDER — CITRIC ACID/SODIUM CITRATE 334-500MG
30 SOLUTION, ORAL ORAL AS NEEDED
Status: DISCONTINUED | OUTPATIENT
Start: 2024-06-10 | End: 2024-06-10

## 2024-06-10 RX ORDER — LIDOCAINE HYDROCHLORIDE AND EPINEPHRINE 15; 5 MG/ML; UG/ML
INJECTION, SOLUTION EPIDURAL AS NEEDED
Status: DISCONTINUED | OUTPATIENT
Start: 2024-06-10 | End: 2024-06-10 | Stop reason: SURG

## 2024-06-10 RX ORDER — MISOPROSTOL 200 UG/1
TABLET ORAL
Status: DISCONTINUED
Start: 2024-06-10 | End: 2024-06-10 | Stop reason: WASHOUT

## 2024-06-10 RX ORDER — SIMETHICONE 80 MG
80 TABLET,CHEWABLE ORAL 3 TIMES DAILY PRN
Status: DISCONTINUED | OUTPATIENT
Start: 2024-06-10 | End: 2024-06-12

## 2024-06-10 RX ORDER — BUPIVACAINE HYDROCHLORIDE 2.5 MG/ML
20 INJECTION, SOLUTION EPIDURAL; INFILTRATION; INTRACAUDAL ONCE
Status: DISCONTINUED | OUTPATIENT
Start: 2024-06-10 | End: 2024-06-10

## 2024-06-10 RX ORDER — DIPHENHYDRAMINE HYDROCHLORIDE 50 MG/ML
12.5 INJECTION INTRAMUSCULAR; INTRAVENOUS EVERY 4 HOURS PRN
Status: DISCONTINUED | OUTPATIENT
Start: 2024-06-10 | End: 2024-06-10

## 2024-06-10 RX ORDER — LIDOCAINE HYDROCHLORIDE 10 MG/ML
INJECTION, SOLUTION EPIDURAL; INFILTRATION; INTRACAUDAL; PERINEURAL AS NEEDED
Status: DISCONTINUED | OUTPATIENT
Start: 2024-06-10 | End: 2024-06-10 | Stop reason: SURG

## 2024-06-10 RX ORDER — LIDOCAINE HYDROCHLORIDE 10 MG/ML
30 INJECTION, SOLUTION EPIDURAL; INFILTRATION; INTRACAUDAL; PERINEURAL ONCE
Status: DISCONTINUED | OUTPATIENT
Start: 2024-06-10 | End: 2024-06-10

## 2024-06-10 RX ORDER — ACETAMINOPHEN 500 MG
1000 TABLET ORAL EVERY 6 HOURS PRN
Status: DISCONTINUED | OUTPATIENT
Start: 2024-06-10 | End: 2024-06-10

## 2024-06-10 RX ORDER — BUPIVACAINE HCL/0.9 % NACL/PF 0.25 %
5 PLASTIC BAG, INJECTION (ML) EPIDURAL AS NEEDED
Status: DISCONTINUED | OUTPATIENT
Start: 2024-06-10 | End: 2024-06-10

## 2024-06-10 RX ORDER — DOCUSATE SODIUM 100 MG/1
100 CAPSULE, LIQUID FILLED ORAL
Status: DISCONTINUED | OUTPATIENT
Start: 2024-06-10 | End: 2024-06-12

## 2024-06-10 RX ORDER — BENZOCAINE/MENTHOL 6 MG-10 MG
1 LOZENGE MUCOUS MEMBRANE EVERY 6 HOURS PRN
Status: DISCONTINUED | OUTPATIENT
Start: 2024-06-10 | End: 2024-06-12

## 2024-06-10 RX ORDER — BISACODYL 10 MG
10 SUPPOSITORY, RECTAL RECTAL ONCE AS NEEDED
Status: DISCONTINUED | OUTPATIENT
Start: 2024-06-10 | End: 2024-06-12

## 2024-06-10 RX ORDER — ONDANSETRON 2 MG/ML
4 INJECTION INTRAMUSCULAR; INTRAVENOUS EVERY 6 HOURS PRN
Status: DISCONTINUED | OUTPATIENT
Start: 2024-06-10 | End: 2024-06-10

## 2024-06-10 RX ORDER — METHYLERGONOVINE MALEATE 0.2 MG/ML
INJECTION INTRAVENOUS
Status: COMPLETED
Start: 2024-06-10 | End: 2024-06-10

## 2024-06-10 RX ORDER — ACETAMINOPHEN 500 MG
1000 TABLET ORAL EVERY 6 HOURS PRN
Status: DISCONTINUED | OUTPATIENT
Start: 2024-06-10 | End: 2024-06-12

## 2024-06-10 RX ORDER — IBUPROFEN 600 MG/1
600 TABLET ORAL EVERY 6 HOURS
Status: DISCONTINUED | OUTPATIENT
Start: 2024-06-10 | End: 2024-06-12

## 2024-06-10 RX ORDER — ONDANSETRON 2 MG/ML
4 INJECTION INTRAMUSCULAR; INTRAVENOUS EVERY 6 HOURS PRN
Status: DISCONTINUED | OUTPATIENT
Start: 2024-06-10 | End: 2024-06-12

## 2024-06-10 RX ORDER — DEXTROSE, SODIUM CHLORIDE, SODIUM LACTATE, POTASSIUM CHLORIDE, AND CALCIUM CHLORIDE 5; .6; .31; .03; .02 G/100ML; G/100ML; G/100ML; G/100ML; G/100ML
INJECTION, SOLUTION INTRAVENOUS AS NEEDED
Status: DISCONTINUED | OUTPATIENT
Start: 2024-06-10 | End: 2024-06-10

## 2024-06-10 RX ADMIN — LIDOCAINE HYDROCHLORIDE AND EPINEPHRINE 5 ML: 15; 5 INJECTION, SOLUTION EPIDURAL at 10:47:00

## 2024-06-10 RX ADMIN — LIDOCAINE HYDROCHLORIDE 5 ML: 10 INJECTION, SOLUTION EPIDURAL; INFILTRATION; INTRACAUDAL; PERINEURAL at 10:43:00

## 2024-06-10 NOTE — H&P
GYN H&P     6/10/2024  9:18 AM    CC: Patient is here for SROM at 4:30 AM 6/10/2024    HPI: Patient is a 33 year old  presents with SROM  and contractions at 39 6/7 W    Pregnancy c/b  AMA  Hx of cerclage  GBBS positive    M    Patient's last menstrual period was 2023 (exact date).    OB History    Para Term  AB Living   1 0 0 0 0 0   SAB IAB Ectopic Multiple Live Births   0 0 0 0 0      # Outcome Date GA Lbr Dany/2nd Weight Sex Type Anes PTL Lv   1 Current                GYN hx:         Past Medical History:    HPV in female    PCOS (polycystic ovarian syndrome)     Past Surgical History:   Procedure Laterality Date    Hc cervical cerclage  2024     No Known Allergies  Family History   Problem Relation Age of Onset    No Known Problems Father     High Cholesterol Mother     Asthma Maternal Grandmother     Cancer Maternal Grandmother         lung smoker    No Known Problems Maternal Grandfather     Other (altzhirmer) Paternal Grandmother     Dementia Paternal Grandmother     Mental Disorder Sister     Other (Multiple sclerosis) Sister 22    No Known Problems Brother     Breast Cancer Neg     Colon Cancer Neg     Ovarian Cancer Neg      Social History     Socioeconomic History    Marital status:    Occupational History    Occupation: manager   Tobacco Use    Smoking status: Never     Passive exposure: Never    Smokeless tobacco: Never   Vaping Use    Vaping status: Never Used   Substance and Sexual Activity    Alcohol use: Not Currently    Drug use: Not Currently    Sexual activity: Yes     Partners: Male     Social History     Social History Narrative    Live with boyfriend       Medications reviewed. See active list.     /78   Pulse 89   Temp 98.1 °F (36.7 °C) (Oral)   Resp 16   Ht 66\"   Wt 236 lb (107 kg)   LMP 2023 (Exact Date)   BMI 38.09 kg/m²       Exam:   GENERAL: well developed, well nourished, in no apparent distress  SKIN: no rashes, no suspicious  lesions    ABDOMEN: Soft, non distended; non tender, gravid, nontender.  EXT: no calf tendeerness        A/P: Patient is 33 year old female  at 39 6/7 W admitted with SROM and contractions    Hx of cerclage (removed)    GBBS postivie - on ABX    Lorena Palmer MD

## 2024-06-10 NOTE — PROGRESS NOTES
Pt is a 33 year old female admitted to R6/LDR6-A.     Chief Complaint   Patient presents with    R/o Labor     Pt reports contractions every 2-3 mintues      Pt is  39w6d intra-uterine pregnancy.  History obtained, consents signed. Oriented to room, staff, and plan of care.

## 2024-06-10 NOTE — PLAN OF CARE
Problem: BIRTH - VAGINAL/ SECTION  Goal: Fetal and maternal status remain reassuring during the birth process  Description: INTERVENTIONS:  - Monitor vital signs  - Monitor fetal heart rate  - Monitor uterine activity  - Monitor labor progression (vaginal delivery)  - DVT prophylaxis (C/S delivery)  - Surgical antibiotic prophylaxis (C/S delivery)  Outcome: Progressing     Problem: PAIN - ADULT  Goal: Verbalizes/displays adequate comfort level or patient's stated pain goal  Description: INTERVENTIONS:  - Encourage pt to monitor pain and request assistance  - Assess pain using appropriate pain scale  - Administer analgesics based on type and severity of pain and evaluate response  - Implement non-pharmacological measures as appropriate and evaluate response  - Consider cultural and social influences on pain and pain management  - Manage/alleviate anxiety  - Utilize distraction and/or relaxation techniques  - Monitor for opioid side effects  - Notify MD/LIP if interventions unsuccessful or patient reports new pain  - Anticipate increased pain with activity and pre-medicate as appropriate  Outcome: Progressing     Problem: ANXIETY  Goal: Will report anxiety at manageable levels  Description: INTERVENTIONS:  - Administer medication as ordered  - Teach and rehearse alternative coping skills  - Provide emotional support with 1:1 interaction with staff  Outcome: Progressing     Problem: Patient Centered Care  Goal: Patient preferences are identified and integrated in the patient's plan of care  Description: Interventions:  - What would you like us to know as we care for you? Having a boy.  - Provide timely, complete, and accurate information to patient/family  - Incorporate patient and family knowledge, values, beliefs, and cultural backgrounds into the planning and delivery of care  - Encourage patient/family to participate in care and decision-making at the level they choose  - Honor patient and family  perspectives and choices  Outcome: Progressing     Problem: Patient/Family Goals  Goal: Patient/Family Long Term Goal  Description: Patient's Long Term Goal: Healthy baby and healthy mother via vaginal delivery.     Interventions:  - Close monitoring of mother and baby.  - See additional Care Plan goals for specific interventions  Outcome: Progressing  Goal: Patient/Family Short Term Goal  Description: Patient's Short Term Goal: Pain management    Interventions:   - PRN pain medications and epidural  - See additional Care Plan goals for specific interventions  Outcome: Progressing

## 2024-06-10 NOTE — PROGRESS NOTES
Pt is a 33 year old female admitted to TR2/TR2-A.     Chief Complaint   Patient presents with    R/o Labor     Pt reports contractions every 2-3 mintues      Pt is  39w6d intra-uterine pregnancy.  History obtained, consents signed. Oriented to room, staff, and plan of care.

## 2024-06-10 NOTE — ANESTHESIA PROCEDURE NOTES
Labor Analgesia    Date/Time: 6/10/2024 10:37 AM    Performed by: Brooklyn Spencer DO  Authorized by: Brooklyn Spencer DO      General Information and Staff    Start Time:  6/10/2024 10:37 AM  End Time:  6/10/2024 10:47 AM  Anesthesiologist:  Brooklyn Spencer DO  Performed by:  Anesthesiologist  Patient Location:  OB  Site Identification: surface landmarks    Reason for Block: labor epidural    Preanesthetic Checklist: patient identified, IV checked, site marked, risks and benefits discussed, monitors and equipment checked, pre-op evaluation, timeout performed, IV bolus, anesthesia consent and sterile technique used      Procedure Details    Patient Position:  Sitting  Prep: ChloraPrep and patient draped    Monitoring:  Heart rate and continuous pulse ox  Approach:  Midline    Epidural Needle    Injection Technique:  KEEGAN air  Needle Type:  Tuohy  Needle Gauge:  18 G  Needle Length:  3.5 in  Needle Insertion Depth:  6.5  Location:  L2-3    Spinal Needle    Needle Type:  Pencil-tip  Needle Gauge:  27 G  Needle Length:  4.75 in    Catheter    Catheter Type:  Multi-orifice  Catheter Size:  20 G  Catheter at Skin Depth:  11  Test Dose:  Negative    Assessment      Additional Comments     Combined Spinal Epidural Analgesia

## 2024-06-10 NOTE — ANESTHESIA PREPROCEDURE EVALUATION
Anesthesia PreOp Note    HPI:     Martina Sheikh is a 33 year old female who presents for preoperative consultation requested by: * No surgeons listed *    Date of Surgery: 6/10/2024    * No procedures listed *  Indication: * No pre-op diagnosis entered *    Relevant Problems   No relevant active problems       NPO:                         History Review:  Patient Active Problem List    Diagnosis Date Noted    Group B Streptococcus carrier, antepartum ([facility]) 05/20/2024    Pregnancy ([facility]) 02/01/2024    Short cervix during pregnancy in second trimester ([facility]) 01/26/2024    AMA (advanced maternal age) primigravida 35+ ([facility]) 11/21/2023    neg cfDNA 2024 pregnancy 11/20/2023       Past Medical History:    HPV in female    PCOS (polycystic ovarian syndrome)       Past Surgical History:   Procedure Laterality Date    Hc cervical cerclage  02/01/2024       Medications Prior to Admission   Medication Sig Dispense Refill Last Dose    FAMOTIDINE 20 MG Oral Tab TAKE 1 TABLET(20 MG) BY MOUTH TWICE DAILY 180 tablet 0 6/9/2024    magnesium 250 MG Oral Tab Take 1 tablet (250 mg total) by mouth.   Past Month    Calcium Carbonate 600 MG Oral Tab Take 1 tablet (600 mg total) by mouth.   6/9/2024    Prenatal Vit-Fe Fumarate-FA (PRENATAL FA OR) Take by mouth.   6/9/2024    acidophilus-pectin Oral Cap Take 1 capsule by mouth daily.   6/9/2024    ursodiol 300 MG Oral Cap Take 1 capsule (300 mg total) by mouth in the morning, at noon, and at bedtime. (Patient not taking: Reported on 5/20/2024) 270 capsule 0     progesterone 100 MG Oral Cap Place 2 capsules (200 mg total) vaginally nightly. OK to substitute with vaginal progesterone that insurance covers (Patient not taking: Reported on 5/20/2024) 180 capsule 1     metFORMIN 500 MG Oral Tab Take 1 tablet (500 mg total) by mouth 2 (two) times daily with meals. (Patient not taking: Reported on 1/29/2024) 180 tablet 4     ergocalciferol 1.25 MG (67057 UT) Oral Cap Take 1 capsule (50,000 Units  total) by mouth once a week. (Patient not taking: Reported on 9/6/2023) 24 capsule 0     APPLE CIDER VINEGAR OR Take by mouth. (Patient not taking: Reported on 1/10/2024)        Current Facility-Administered Medications Ordered in Epic   Medication Dose Route Frequency Provider Last Rate Last Admin    acetaminophen (Tylenol Extra Strength) tab 500 mg  500 mg Oral Q6H PRN Keerthi Masr MD        acetaminophen (Tylenol Extra Strength) tab 1,000 mg  1,000 mg Oral Q6H PRN Keerthi Mars MD        ibuprofen (Motrin) tab 600 mg  600 mg Oral Once PRN Keerthi Mars MD        ondansetron (Zofran) 4 MG/2ML injection 4 mg  4 mg Intravenous Q6H PRN Keerthi Mars MD        oxyTOCIN in sodium chloride 0.9% (Pitocin) 30 Units/500mL infusion premix  62.5-900 joselyn-units/min Intravenous Continuous Keerthi Mars MD   Held at 06/10/24 0600    terbutaline (Brethine) 1 MG/ML injection 0.25 mg  0.25 mg Subcutaneous PRN Keerthi Mars MD        sodium citrate-citric acid (Bicitra) 500-334 MG/5ML oral solution 30 mL  30 mL Oral PRN Keerthi Mars MD        lidocaine PF (Xylocaine-MPF) 1% injection  30 mL Intradermal Once Keerthi Mars MD        lactated ringers infusion   Intravenous Continuous Keerthi Mars  mL/hr at 06/10/24 1026 Restarted at 06/10/24 1026    dextrose in lactated ringers 5% infusion   Intravenous PRN Keerthi Mars MD        lactated ringers IV bolus 500 mL  500 mL Intravenous PRN Keerthi Mars MD        fentaNYL (Sublimaze) 50 mcg/mL injection 50 mcg  50 mcg Intravenous Q30 Min PRN Keerthi Mars MD   50 mcg at 06/10/24 0818    ampicillin (Omnipen) 1 g in sodium chloride 0.9% 100 mL IVPB-MBP  1 g Intravenous Q4H Keerthi Mars MD        lactated ringers IV bolus 1,000 mL  1,000 mL Intravenous Once Brooklyn Spencer DO   Stopped at 06/10/24 1026    fentaNYL-bupivacaine 2 mcg/mL-0.125% in sodium chloride 0.9% 100 mL EPIDURAL infusion premix    Epidural Continuous Brooklyn Spencer DO        fentaNYL (Sublimaze) 50 mcg/mL injection 100 mcg  100 mcg Epidural Once Brooklyn Spencer DO        bupivacaine PF (Marcaine) 0.25% injection  20 mL Epidural Once Brooklyn Spencer DO        EPHEDrine (PF) 25 MG/5 ML injection 5 mg  5 mg Intravenous PRN Brooklyn Spencer DO        nalbuphine (Nubain) 10 mg/mL injection 2.5 mg  2.5 mg Intravenous Q15 Min PRN Brooklyn Spencer DO         No current [facility]-ordered outpatient medications on file.       No Known Allergies    Family History   Problem Relation Age of Onset    No Known Problems Father     High Cholesterol Mother     Asthma Maternal Grandmother     Cancer Maternal Grandmother         lung smoker    No Known Problems Maternal Grandfather     Other (altzhirmer) Paternal Grandmother     Dementia Paternal Grandmother     Mental Disorder Sister     Other (Multiple sclerosis) Sister 22    No Known Problems Brother     Breast Cancer Neg     Colon Cancer Neg     Ovarian Cancer Neg      Social History     Socioeconomic History    Marital status:    Occupational History    Occupation: manager   Tobacco Use    Smoking status: Never     Passive exposure: Never    Smokeless tobacco: Never   Vaping Use    Vaping status: Never Used   Substance and Sexual Activity    Alcohol use: Not Currently    Drug use: Not Currently    Sexual activity: Yes     Partners: Male   Other Topics Concern    Blood Transfusions No    Caffeine Concern No    Stress Concern No    Weight Concern No    Special Diet No    Exercise Yes    Seat Belt Yes    History of tanning Yes    Reaction to local anesthetic No       Available pre-op labs reviewed.  Lab Results   Component Value Date    WBC 18.7 (H) 06/10/2024    RBC 4.08 06/10/2024    HGB 12.2 06/10/2024    HCT 35.0 06/10/2024    MCV 85.8 06/10/2024    MCH 29.9 06/10/2024    MCHC 34.9 06/10/2024    RDW 12.6 06/10/2024    .0 06/10/2024             Vital Signs:  Body mass  index is 38.09 kg/m².   height is 1.676 m (5' 6\") and weight is 107 kg (236 lb). Her oral temperature is 98 °F (36.7 °C). Her blood pressure is 132/78 and her pulse is 89. Her respiration is 16.   Vitals:    06/10/24 0530 06/10/24 0545 06/10/24 0628 06/10/24 0715   BP: 141/82 128/83 (!) 136/92 132/78   Pulse: 88 87 98 89   Resp:   16    Temp:   98.1 °F (36.7 °C) 98 °F (36.7 °C)   TempSrc:   Oral Oral   Weight:   107 kg (236 lb)    Height:   1.676 m (5' 6\")         Anesthesia Evaluation      No history of anesthetic complications   Airway   TM distance: >3 FB  Neck ROM: full  Dental      Pulmonary - negative ROS and normal exam   (-) asthma, shortness of breath, recent URI  Cardiovascular - negative ROS  Exercise tolerance: good  (-) hypertension, valvular problems/murmurs, dysrhythmias    Rhythm: regular  Rate: normal    Neuro/Psych - negative ROS   (-) seizures, neuromuscular disease    GI/Hepatic/Renal - negative ROS   (-) hepatitis, liver disease, renal disease    Endo/Other    (-) diabetes mellitus    Comments: PCOS     Abdominal   (+) obese                 Anesthesia Plan:   ASA:  3  Plan:   Epidural  Post-op Pain Management: Continuous epidural and Intrathecal narcotics  Informed Consent Plan and Risks Discussed With:  Patient      I have informed Martina ADRIANA Sak and/or legal guardian or family member of the nature of the anesthetic plan, benefits, risks including possible dental damage if relevant, major complications, and any alternative forms of anesthetic management.   All of the patient's questions were answered to the best of my ability. The patient desires the anesthetic management as planned.  Brooklyn Spencer DO  6/10/2024 10:26 AM  Present on Admission:   Pregnancy (HCC)

## 2024-06-10 NOTE — PROGRESS NOTES
Pt ambulated to LDR 6 accompanied by staff and SO. Oriented to room and POC. Report given to Dea GARCIA

## 2024-06-11 LAB
BASOPHILS # BLD AUTO: 0.07 X10(3) UL (ref 0–0.2)
BASOPHILS NFR BLD AUTO: 0.3 %
DEPRECATED RDW RBC AUTO: 40.4 FL (ref 35.1–46.3)
EOSINOPHIL # BLD AUTO: 0.02 X10(3) UL (ref 0–0.7)
EOSINOPHIL NFR BLD AUTO: 0.1 %
ERYTHROCYTE [DISTWIDTH] IN BLOOD BY AUTOMATED COUNT: 12.8 % (ref 11–15)
FIBRINOGEN PPP-MCNC: 568 MG/DL (ref 200–480)
HCT VFR BLD AUTO: 26.9 %
HGB BLD-MCNC: 9.1 G/DL
IMM GRANULOCYTES # BLD AUTO: 0.29 X10(3) UL (ref 0–1)
IMM GRANULOCYTES NFR BLD: 1.2 %
LYMPHOCYTES # BLD AUTO: 2.33 X10(3) UL (ref 1–4)
LYMPHOCYTES NFR BLD AUTO: 9.8 %
MCH RBC QN AUTO: 29.4 PG (ref 26–34)
MCHC RBC AUTO-ENTMCNC: 33.8 G/DL (ref 31–37)
MCV RBC AUTO: 86.8 FL
MONOCYTES # BLD AUTO: 0.96 X10(3) UL (ref 0.1–1)
MONOCYTES NFR BLD AUTO: 4 %
NEUTROPHILS # BLD AUTO: 20.15 X10 (3) UL (ref 1.5–7.7)
NEUTROPHILS # BLD AUTO: 20.15 X10(3) UL (ref 1.5–7.7)
NEUTROPHILS NFR BLD AUTO: 84.6 %
PLATELET # BLD AUTO: 206 10(3)UL (ref 150–450)
RBC # BLD AUTO: 3.1 X10(6)UL
WBC # BLD AUTO: 23.8 X10(3) UL (ref 4–11)

## 2024-06-11 PROCEDURE — 85025 COMPLETE CBC W/AUTO DIFF WBC: CPT | Performed by: OBSTETRICS & GYNECOLOGY

## 2024-06-11 RX ORDER — FAMOTIDINE 20 MG/1
20 TABLET, FILM COATED ORAL 2 TIMES DAILY PRN
Status: DISCONTINUED | OUTPATIENT
Start: 2024-06-11 | End: 2024-06-12

## 2024-06-11 NOTE — LACTATION NOTE
This note was copied from a baby's chart.  LACTATION NOTE - INFANT    Evaluation Type  Evaluation Type: Inpatient    Problems & Assessment  Infant Assessment: Anterior fontanel soft and flat  Muscle tone: Appropriate for GA    Feeding Assessment  Summary Current Feeding: Breastfeeding exclusively  Breastfeeding Assessment: Assisted with breastfeeding w/mother's permission;Calm and ready to breastfeed;Coordinated suck/swallow;Tolerated feeding well;Deep latch achieved and observed  Breastfeeding lasted # of minutes: 25  Breastfeeding Positions: football;right breast;left breast  Latch: Grasps breast, tongue down, lips flanged, rhythmic sucking  Audible Sucks/Swallows: Spontaneous and intermittent (24 hours old)  Type of Nipple: Everted (after stimulation)  Comfort (Breast/Nipple): Filling, red/small blisters/bruises, mild/mod discomfort  Hold (Positioning): Full assist, teach one side, mother does other, staff holds  LATCH Score: 8

## 2024-06-11 NOTE — PROGRESS NOTES
Patient up to bathroom with assist x 2.  Patient transferred to mother/baby room 365 per bed in stable condition with baby and personal belongings.  Accompanied by significant other and staff.  Report given to Merline mother/baby JOSE.

## 2024-06-11 NOTE — LACTATION NOTE
LACTATION NOTE - MOTHER      Evaluation Type: Inpatient    Problems identified  Problems identified: Knowledge deficit    Maternal history  Maternal history: AMA;Polycystic ovarian syndrome (PCOS)  Other/comment: cerclage    Breastfeeding goal  Breastfeeding goal: To maintain breast milk feeding per patient goal    Maternal Assessment  Bilateral Breasts: Soft;Symmetrical  Bilateral Nipples: Colostrum easily expressed;Everted;WNL  Prior breastfeeding experience (comment below): Primip  Breastfeeding Assistance: Breastfeeding assistance provided with permission    Pain assessment  Pain, additional: Pain w/initial sucks only  Treatment of Sore Nipples: Deeper latch techniques    Guidelines for use of:  Current use of pump:: not indicated

## 2024-06-11 NOTE — PROGRESS NOTES
Received patient into room via bed and accompanied by significant other as well as nursing staff.  Pt transferred to bed. VSS WNL. ID bands matched with L&D RN. Patient oriented to unit, room, and call light within reach. POC reviewed with patient. Instructed to call for assistance when ready to void. Report received from JOSE Cagle.

## 2024-06-11 NOTE — L&D DELIVERY NOTE
Reji Sheikh [[id number]]      Labor Events     labor?: No   steroids?: None  Antibiotics received during labor?: Yes  Antibiotics (enter # doses in comment): ampicillin (Comment: x3)  Rupture date/time: 6/10/2024 0430     Rupture type: SROM, AROM  Fluid color: Clear  Labor type: Spontaneous Onset of Labor  Augmentation: Oxytocin, AROM  Indications for augmentation: Ineffective Contraction Pattern  Intrapartum & labor complications: Group B beta strep +, Late decelerations, Variable decelerations       Labor Event Times    Labor onset date/time: 6/10/2024 0430  Dilation complete date/time: 6/10/2024 1830  Start pushing date/time: 6/10/2024 1835       [facility] Presentation    Presentation: Vertex       Operative Delivery    No data filed       Shoulder Dystocia    No data filed       Anesthesia    Method: Epidural   Analgesics:  Analgesics   FENTANYL CITRATE             [facility] Delivery      Head delivery date/time: 6/10/2024 21:14:15   Delivery date/time:  6/10/24 21:15:10   Delivery type: Normal spontaneous vaginal delivery    Details:            Delivery Providers       Delivery personnel:  Provider Role    Baby Nurse    Delivery Nurse             Cord    No data filed        Measurements    No data filed       Placenta    Date/time: 6/10/2024 2119  Removal: Spontaneous  Disposition: Discarded       Apgars    Living status: Living   Apgar Scoring Key:    0 1 2    Skin color Blue or pale Acrocyanotic Completely pink    Heart rate Absent <100 bpm >100 bpm    Reflex irritability No response Grimace Cry or active withdrawal    Muscle tone Limp Some flexion Active motion    Respiratory effort Absent Weak cry; hypoventilation Good, crying              1 Minute:  5 Minute:  10 Minute:  15 Minute:  20 Minute:      Skin color:        Heart rate:        Reflex irritablity:        Muscle tone:        Respiratory effort:        Total:                Skin to Skin    No data filed       Vaginal Count     Initial count RN: Ursula Barrera RN  Initial count Tech: Obinna Wheat   Sharps    Initial counts 10   0    Final counts               Lacerations    No data filed            Patient complete and pushing, delivered a viable male infant. + nuchal cord x 1 (loose), clear amniotic fluid. Placenta delivered spont, intact 3VC. + PP hemorrhage. Uterus manually explored with no remaining placenta and vagina, cervix explored with no cervical laceration. Patient continued to have bleeding despite uterine massage, drainage of bladder, increased pitocin, TXA and methergine. Janet ballon placed inside uterus and vaginal balloon inflated with 120 ml of saline with no further bleeding.  + ML 2nd degree laceration repaired with 2.0 and 3.0 vicryl. Rectum and cervix intact.

## 2024-06-11 NOTE — ANESTHESIA POSTPROCEDURE EVALUATION
Patient: Martina Sheikh    Procedure Summary       Date: 06/10/24 Room / Location:     Anesthesia Start: 1037 Anesthesia Stop: 2119    Procedure: LABOR ANALGESIA Diagnosis:     Scheduled Providers:  Anesthesiologist: Brooklyn Spencer DO    Anesthesia Type: epidural ASA Status: 3            Anesthesia Type: epidural    Vitals Value Taken Time   /86 06/10/24 2216   Temp 99.4 °F (37.4 °C) 06/10/24 2200   Pulse 117 06/10/24 2216   Resp 18 06/10/24 2200   SpO2 99 % 06/10/24 2215   Vitals shown include unfiled device data.    [facility] AN Post Evaluation:   Patient Evaluated in floor  Patient Participation: complete - patient participated  Level of Consciousness: awake and alert  Pain Score: 0  Pain Management: satisfactory to patient  Airway Patency:patent  Yes    Cardiovascular Status: hemodynamically stable  Respiratory Status: nonlabored ventilation, spontaneous ventilation and room air  Postoperative Hydration stable      Brooklyn Spencer DO  6/11/2024 8:26 AM

## 2024-06-11 NOTE — PLAN OF CARE
Problem: Patient Centered Care  Goal: Patient preferences are identified and integrated in the patient's plan of care  Description: Interventions:  - What would you like us to know as we care for you? It's a boy!  - Provide timely, complete, and accurate information to patient/family  - Incorporate patient and family knowledge, values, beliefs, and cultural backgrounds into the planning and delivery of care  - Encourage patient/family to participate in care and decision-making at the level they choose  - Honor patient and family perspectives and choices  Outcome: Progressing     Problem: Patient/Family Goals  Goal: Patient/Family Long Term Goal  Description: Patient's Long Term Goal: Uncomplicated Delivery     Interventions:  - Assessment/Monitoring  - Induction/Augmentation per protocol and Provider order  - C/S per protocol and Provider order   - Education  - Intervention per protocol and Provider order with education   - Involve patient in POC  - See additional Care Plan goals for specific interventions  Outcome: Progressing  Goal: Patient/Family Short Term Goal  Description: Patient's Short Term Goal: Comfort and Pain Control     Interventions:   - Non Pharmacological pain intervention   - IV/IM and Epidural pain medication per Provider order and patient request  - Education  - Involve Patient in POC   - See additional Care Plan goals for specific interventions  Outcome: Progressing     Problem: GENITOURINARY - ADULT  Goal: Absence of urinary retention  Description: INTERVENTIONS:  - Assess patient’s ability to void and empty bladder  - Monitor intake/output and perform bladder scan as needed  - Follow urinary retention protocol/standard of care  - Consider collaborating with pharmacy to review patient's medication profile  - Implement strategies to promote bladder emptying  Outcome: Progressing     Problem: POSTPARTUM  Goal: Long Term Goal:Experiences normal postpartum course  Description: INTERVENTIONS:  -  Assess and monitor vital signs and lab values.  - Assess fundus and lochia.  - Provide ice/sitz baths for perineum discomfort.  - Monitor healing of incision/episiotomy/laceration, and assess for signs and symptoms of infection and hematoma.  - Assess bladder function and monitor for bladder distention.  - Provide/instruct/assist with pericare as needed.  - Provide VTE prophylaxis as needed.  - Monitor bowel function.  - Encourage ambulation and provide assistance as needed.  - Assess and monitor emotional status and provide social service/psych resources as needed.  - Utilize standard precautions and use personal protective equipment as indicated. Ensure aseptic care of all intravenous lines and invasive tubes/drains.  - Obtain immunization and exposure to communicable diseases history.  Outcome: Progressing  Goal: Optimize infant feeding at the breast  Description: INTERVENTIONS:  - Initiate breast feeding within first hour after birth.   - Monitor effectiveness of current breast feeding efforts.  - Assess support systems available to mother/family.  - Identify cultural beliefs/practices regarding lactation, letdown techniques, maternal food preferences.  - Assess mother's knowledge and previous experience with breast feeding.  - Provide information as needed about early infant feeding cues (e.g., rooting, lip smacking, sucking fingers/hand) versus late cue of crying.  - Discuss/demonstrate breast feeding aids (e.g., infant sling, nursing footstool/pillows, and breast pumps).  - Encourage mother/other family members to express feelings/concerns, and actively listen.  - Educate father/SO about benefits of breast feeding and how to manage common lactation challenges.  - Recommend avoidance of specific medications or substances incompatible with breast feeding.  - Assess and monitor for signs of nipple pain/trauma.  - Instruct and provide assistance with proper latch.  - Review techniques for milk expression (breast  pumping) and storage of breast milk. Provide pumping equipment/supplies, instructions and assistance, as needed.  - Encourage rooming-in and breast feeding on demand.  - Encourage skin-to-skin contact.  - Provide LC support as needed.  - Assess for and manage engorgement.  - Provide breast feeding education handouts and information on community breast feeding support.   Outcome: Progressing  Goal: Establishment of adequate milk supply with medication/procedure interruptions  Description: INTERVENTIONS:  - Review techniques for milk expression (breast pumping).   - Provide pumping equipment/supplies, instructions, and assistance until it is safe to breastfeed infant.  Outcome: Progressing  Goal: Appropriate maternal -  bonding  Description: INTERVENTIONS:  - Assess caregiver- interactions.  - Assess caregiver's emotional status and coping mechanisms.  - Encourage caregiver to participate in  daily care.  - Assess support systems available to mother/family.  - Provide /case management support as needed.  Outcome: Progressing

## 2024-06-11 NOTE — PROGRESS NOTES
[facility]  Obstetrics and Gynecology    OB/GYN: Postpartum Progress Note     SUBJECTIVE:  Patient is a 33 year old  female who is s/p  c/b PPH requiring PARRISH which has been removed. She is PPD# 1.   Doing well. Noted bleeding is moderate. Denies fever, chills, N, V, chest pain and SOB. Has not voided since bledsoe was removed this morning. No Bm. Tolerating general diet. Ambulating without difficulty.      OBJECTIVE:  Vitals:    24 0530 24 0600 24 0630 24 0830   BP:   124/78 127/78   Pulse: 93 94 93 103   Resp:   16 18   Temp:   98.1 °F (36.7 °C) 98.4 °F (36.9 °C)   TempSrc:   Oral Axillary   SpO2: 96% 95% 96%    Weight:       Height:           Physical Exam:    General:    alert, appears stated age, and cooperative   Lochia:  appropriate   Uterine Fundus:   firm below umbilicus   Perineum:  healing well, no significant drainage, no dehiscence, no significant erythema    DVT Evaluation:  No evidence of DVT seen on physical exam.     Urinary output is adequate.     Labs:  Recent Labs   Lab 24  0608   RBC 3.10*   HGB 9.1*   HCT 26.9*   MCV 86.8   MCH 29.4   MCHC 33.8   RDW 12.8   NEPRELIM 20.15*   WBC 23.8*   .0       ASSESSMENT/PLAN:  Patient is a 33 year old  female who is s/p  c/b PPH, PPD# 1.     Doing well   Continue routine postpartum care  Vitals per routine   Encourage ambulation     Acute blood loss anemia  - due to PPH  - had PARRISH in place for approximately 8 hours.  - plan for PO iron on discharge home    The patient had a male infant, and desires circumcision.   She understands there is no medical indication for circumcision. We discussed AAP opinion on procedure as well. She was consented for infant circumcision risks including, but not limited to: bleeding, infection, trauma to other tissue, and need for further procedures.  The patient expressed understanding, questions were answered and she  wishes to proceed with the  procedure for her son.      DO JOSSELINE Nayak

## 2024-06-12 VITALS
RESPIRATION RATE: 16 BRPM | HEART RATE: 79 BPM | DIASTOLIC BLOOD PRESSURE: 70 MMHG | HEIGHT: 66 IN | WEIGHT: 236 LBS | TEMPERATURE: 98 F | BODY MASS INDEX: 37.93 KG/M2 | SYSTOLIC BLOOD PRESSURE: 114 MMHG | OXYGEN SATURATION: 96 %

## 2024-06-12 RX ORDER — IBUPROFEN 600 MG/1
600 TABLET ORAL EVERY 6 HOURS PRN
Qty: 60 TABLET | Refills: 0 | Status: SHIPPED | OUTPATIENT
Start: 2024-06-12

## 2024-06-12 RX ORDER — POLYETHYLENE GLYCOL 3350 17 G/17G
17 POWDER, FOR SOLUTION ORAL 2 TIMES DAILY PRN
Qty: 60 EACH | Refills: 0 | Status: SHIPPED | OUTPATIENT
Start: 2024-06-12 | End: 2024-07-12

## 2024-06-12 RX ORDER — ACETAMINOPHEN 325 MG/1
325 TABLET ORAL EVERY 6 HOURS PRN
Qty: 60 TABLET | Refills: 0 | Status: SHIPPED | OUTPATIENT
Start: 2024-06-12

## 2024-06-12 RX ORDER — DOCUSATE SODIUM 100 MG/1
100 CAPSULE, LIQUID FILLED ORAL 2 TIMES DAILY PRN
Qty: 60 CAPSULE | Refills: 0 | Status: SHIPPED | OUTPATIENT
Start: 2024-06-12 | End: 2024-07-12

## 2024-06-12 NOTE — PLAN OF CARE
Sat with patient to review plan of care. Discussed analgesic options and answered all questions. VSS. Breastfeeding on demand. Breastfeeding successfully. Voiding independently. Lochia is WNL. Uterus is firm.     Problem: Patient Centered Care  Goal: Patient preferences are identified and integrated in the patient's plan of care  Description: Interventions:  - What would you like us to know as we care for you? It's a boy!  - Provide timely, complete, and accurate information to patient/family  - Incorporate patient and family knowledge, values, beliefs, and cultural backgrounds into the planning and delivery of care  - Encourage patient/family to participate in care and decision-making at the level they choose  - Honor patient and family perspectives and choices  Outcome: Adequate for Discharge     Problem: POSTPARTUM  Goal: Long Term Goal:Experiences normal postpartum course  Description: INTERVENTIONS:  - Assess and monitor vital signs and lab values.  - Assess fundus and lochia.  - Provide ice/sitz baths for perineum discomfort.  - Monitor healing of incision/episiotomy/laceration, and assess for signs and symptoms of infection and hematoma.  - Assess bladder function and monitor for bladder distention.  - Provide/instruct/assist with pericare as needed.  - Provide VTE prophylaxis as needed.  - Monitor bowel function.  - Encourage ambulation and provide assistance as needed.  - Assess and monitor emotional status and provide social service/psych resources as needed.  - Utilize standard precautions and use personal protective equipment as indicated. Ensure aseptic care of all intravenous lines and invasive tubes/drains.  - Obtain immunization and exposure to communicable diseases history.  Outcome: Adequate for Discharge  Goal: Optimize infant feeding at the breast  Description: INTERVENTIONS:  - Initiate breast feeding within first hour after birth.   - Monitor effectiveness of current breast feeding efforts.  -  Assess support systems available to mother/family.  - Identify cultural beliefs/practices regarding lactation, letdown techniques, maternal food preferences.  - Assess mother's knowledge and previous experience with breast feeding.  - Provide information as needed about early infant feeding cues (e.g., rooting, lip smacking, sucking fingers/hand) versus late cue of crying.  - Discuss/demonstrate breast feeding aids (e.g., infant sling, nursing footstool/pillows, and breast pumps).  - Encourage mother/other family members to express feelings/concerns, and actively listen.  - Educate father/SO about benefits of breast feeding and how to manage common lactation challenges.  - Recommend avoidance of specific medications or substances incompatible with breast feeding.  - Assess and monitor for signs of nipple pain/trauma.  - Instruct and provide assistance with proper latch.  - Review techniques for milk expression (breast pumping) and storage of breast milk. Provide pumping equipment/supplies, instructions and assistance, as needed.  - Encourage rooming-in and breast feeding on demand.  - Encourage skin-to-skin contact.  - Provide LC support as needed.  - Assess for and manage engorgement.  - Provide breast feeding education handouts and information on community breast feeding support.   Outcome: Adequate for Discharge  Goal: Establishment of adequate milk supply with medication/procedure interruptions  Description: INTERVENTIONS:  - Review techniques for milk expression (breast pumping).   - Provide pumping equipment/supplies, instructions, and assistance until it is safe to breastfeed infant.  Outcome: Adequate for Discharge  Goal: Appropriate maternal -  bonding  Description: INTERVENTIONS:  - Assess caregiver- interactions.  - Assess caregiver's emotional status and coping mechanisms.  - Encourage caregiver to participate in  daily care.  - Assess support systems available to mother/family.  -  Provide /case management support as needed.  Outcome: Adequate for Discharge

## 2024-06-12 NOTE — PLAN OF CARE
Problem: Patient Centered Care  Goal: Patient preferences are identified and integrated in the patient's plan of care  Description: Interventions:  - What would you like us to know as we care for you? It's a boy!  - Provide timely, complete, and accurate information to patient/family  - Incorporate patient and family knowledge, values, beliefs, and cultural backgrounds into the planning and delivery of care  - Encourage patient/family to participate in care and decision-making at the level they choose  - Honor patient and family perspectives and choices  Outcome: Progressing     Problem: POSTPARTUM  Goal: Long Term Goal:Experiences normal postpartum course  Description: INTERVENTIONS:  - Assess and monitor vital signs and lab values.  - Assess fundus and lochia.  - Provide ice/sitz baths for perineum discomfort.  - Monitor healing of incision/episiotomy/laceration, and assess for signs and symptoms of infection and hematoma.  - Assess bladder function and monitor for bladder distention.  - Provide/instruct/assist with pericare as needed.  - Provide VTE prophylaxis as needed.  - Monitor bowel function.  - Encourage ambulation and provide assistance as needed.  - Assess and monitor emotional status and provide social service/psych resources as needed.  - Utilize standard precautions and use personal protective equipment as indicated. Ensure aseptic care of all intravenous lines and invasive tubes/drains.  - Obtain immunization and exposure to communicable diseases history.  Outcome: Progressing  Goal: Optimize infant feeding at the breast  Description: INTERVENTIONS:  - Initiate breast feeding within first hour after birth.   - Monitor effectiveness of current breast feeding efforts.  - Assess support systems available to mother/family.  - Identify cultural beliefs/practices regarding lactation, letdown techniques, maternal food preferences.  - Assess mother's knowledge and previous experience with breast  feeding.  - Provide information as needed about early infant feeding cues (e.g., rooting, lip smacking, sucking fingers/hand) versus late cue of crying.  - Discuss/demonstrate breast feeding aids (e.g., infant sling, nursing footstool/pillows, and breast pumps).  - Encourage mother/other family members to express feelings/concerns, and actively listen.  - Educate father/SO about benefits of breast feeding and how to manage common lactation challenges.  - Recommend avoidance of specific medications or substances incompatible with breast feeding.  - Assess and monitor for signs of nipple pain/trauma.  - Instruct and provide assistance with proper latch.  - Review techniques for milk expression (breast pumping) and storage of breast milk. Provide pumping equipment/supplies, instructions and assistance, as needed.  - Encourage rooming-in and breast feeding on demand.  - Encourage skin-to-skin contact.  - Provide LC support as needed.  - Assess for and manage engorgement.  - Provide breast feeding education handouts and information on community breast feeding support.   Outcome: Progressing  Goal: Establishment of adequate milk supply with medication/procedure interruptions  Description: INTERVENTIONS:  - Review techniques for milk expression (breast pumping).   - Provide pumping equipment/supplies, instructions, and assistance until it is safe to breastfeed infant.  Outcome: Progressing  Goal: Appropriate maternal -  bonding  Description: INTERVENTIONS:  - Assess caregiver- interactions.  - Assess caregiver's emotional status and coping mechanisms.  - Encourage caregiver to participate in  daily care.  - Assess support systems available to mother/family.  - Provide /case management support as needed.  Outcome: Progressing     Problem: BIRTH - VAGINAL/ SECTION  Goal: Fetal and maternal status remain reassuring during the birth process  Description: INTERVENTIONS:  - Monitor  vital signs  - Monitor fetal heart rate  - Monitor uterine activity  - Monitor labor progression (vaginal delivery)  - DVT prophylaxis (C/S delivery)  - Surgical antibiotic prophylaxis (C/S delivery)  Outcome: Completed     Problem: PAIN - ADULT  Goal: Verbalizes/displays adequate comfort level or patient's stated pain goal  Description: INTERVENTIONS:  - Encourage pt to monitor pain and request assistance  - Assess pain using appropriate pain scale  - Administer analgesics based on type and severity of pain and evaluate response  - Implement non-pharmacological measures as appropriate and evaluate response  - Consider cultural and social influences on pain and pain management  - Manage/alleviate anxiety  - Utilize distraction and/or relaxation techniques  - Monitor for opioid side effects  - Notify MD/LIP if interventions unsuccessful or patient reports new pain  - Anticipate increased pain with activity and pre-medicate as appropriate  Outcome: Completed     Problem: ANXIETY  Goal: Will report anxiety at manageable levels  Description: INTERVENTIONS:  - Administer medication as ordered  - Teach and rehearse alternative coping skills  - Provide emotional support with 1:1 interaction with staff  Outcome: Completed     Problem: Patient/Family Goals  Goal: Patient/Family Long Term Goal  Description: Patient's Long Term Goal: Uncomplicated Delivery     Interventions:  - Assessment/Monitoring  - Induction/Augmentation per protocol and Provider order  - C/S per protocol and Provider order   - Education  - Intervention per protocol and Provider order with education   - Involve patient in POC  - See additional Care Plan goals for specific interventions  Outcome: Completed  Goal: Patient/Family Short Term Goal  Description: Patient's Short Term Goal: Comfort and Pain Control     Interventions:   - Non Pharmacological pain intervention   - IV/IM and Epidural pain medication per Provider order and patient request  -  Education  - Involve Patient in POC   - See additional Care Plan goals for specific interventions  Outcome: Completed     Problem: GENITOURINARY - ADULT  Goal: Absence of urinary retention  Description: INTERVENTIONS:  - Assess patient’s ability to void and empty bladder  - Monitor intake/output and perform bladder scan as needed  - Follow urinary retention protocol/standard of care  - Consider collaborating with pharmacy to review patient's medication profile  - Implement strategies to promote bladder emptying  Outcome: Completed

## 2024-06-12 NOTE — DISCHARGE INSTRUCTIONS
Pelvic rest for 6 weeks: nothing in the vagina (tampons, intercourse).   May shower, no bathtubs or swimming.    - Call OB for any concerns:     *Heavy bleeding that saturates one pad per hour for several hours/clots bigger than a golf ball     *Signs of preeclampsia (headache, vision changes, nausea/vomiting)     *Increased pain unrelieved by oral medications     *Anxiety or depression     *Breast concerns     *Fever 100.4 or greater     *Dizziness/fainting     *Calf pain, redness or swelling    Follow up with OB in 6 weeks.     Post Vaginal Delivery Home Care Instructions     We hope you were pleased with your care at [facility].  We wish you the best outcome and overall experience with the delivery of your baby.  These instructions will help to minimize pain, limit the risk for an infection, and improve the likelihood of a successful recovery.    What to Expect:  Abdominal cramping after delivery especially if you are breastfeeding.   Vaginal bleeding for about 4-6 weeks that may be followed by a yellow or white discharge for a few more weeks.  Your period will resume in approximately 6-8 weeks, unless you are breastfeeding.    If you are bottle feeding, you may notice breast engorgement in about 3 days.  Your breast may be sore and hard. Please wear a tight fitted bra or sports bra for 24-36 hours to help prevent your breast from producing milk, and use ice packs to relive any discomfort.  If you are breastfeeding, nipple dryness is very common the first few days.    Constipation is common after having a baby.  Please increase fluid and fiber in your diet.      Over-The-Counter Medication  Non-prescription anti-inflammatory medications can also help to ease the pain.  You may take Aleve, Tylenol or Ibuprofen   Colace or Metamucil for Constipation  Lanolin for dry nipples  Tucks, Witch Hazel and Epifoam for vaginal/perineum discomfort.   Drink a full glass of water with oral medication and take as  directed.    Wound Care  The following instructions will promote proper healing and help to prevent infection  Vaginal/perineum Care: Sitz Bath for 15mins, 2-3 times a day,    Bathing/Showers  You may resume showers  No baths, swimming, hot tubs until your post-partum visit    Home Medication  Resume your home medications as instructed    Diet  Resume your normal diet    Activity  Refrain from vaginal intercourse, vaginal suppositories, tampon use or douches until after your post-partum visit.  No exercising for 4 weeks  You may climb stairs minimally for the 1st week.    Do not do heavy housework for at least 2-3 weeks    Return to Work or School  You may return to work in approximately 6 weeks  Contact your obstetrician’s office, if you need a medical release. ([phone])    Driving  Avoid driving if you are taking narcotics for pain relief.    Follow-up Appointment with Your Obstetrician  Call your obstetrician’s office today for an appointment in 4-6 weeks.    The number is [phone].  Verify your appointment date, day, time, and location.  At your 1st post-partum office visit:  Your progress will be evaluated, findings reviewed, and any additional concerns and instructions will be discussed.    Questions or Concerns  Call your obstetrician’s office if you experience the following:  Severe pain not controlled by pain medication  Foul smelling vaginal discharge  Heavy bleeding  Shortness of breath  Fever  Redness, increased swelling or drainage from your incision  Crying and periods of sadness that prevents you from caring for yourself and your baby  Burning sensation during urination or inability to urinate  Swelling, redness or abnormal warmth to your leg/calf  Please call ([phone]). If your call is made after office hours, a physician will be available to help you.  There is always a provider covering our patients.    Thank you for coming to [facility] to start your new family.  The  nurses and the anesthesiologists try very hard to make sure you receive the best care possible.  Your trust in them as well as us is greatly appreciated.    Thanks so much,   [facility]

## 2024-06-12 NOTE — PROGRESS NOTES
[facility]  Obstetrics and Gynecology    OB/GYN: Postpartum Progress Note     SUBJECTIVE:  Patient is a 33 year old  female who is s/p  complicated by postpartum hemorrhage. She is PPD# 2.   Doing well. Noted no complaints but general soreness and is ok with discharge home today. Denies fever, chills, N, V, chest pain and SOB. Bleeding has been stable. Voiding without difficulty.  Passing flatus.  No Bm. Tolerating general diet. Ambulating without difficulty.     OBJECTIVE:  Vitals:    24 0630 24 0830 24 0147 24 0914   BP: 124/78 127/78 125/83 114/70   Pulse: 93 103 83 79   Resp: 16 18  16   Temp: 98.1 °F (36.7 °C) 98.4 °F (36.9 °C) 97.4 °F (36.3 °C) 97.6 °F (36.4 °C)   TempSrc: Oral Axillary Oral Oral   SpO2: 96%      Weight:       Height:           Physical Exam:    General:    alert, appears stated age, and cooperative   Lochia:  appropriate   Uterine Fundus:   firm at the umbilicus   Perineum:  healing well    DVT Evaluation:  No evidence of DVT seen on physical exam.     Urinary output is adequate. (When recorded)    Labs:  Recent Labs   Lab 24  0608   RBC 3.10*   HGB 9.1*   HCT 26.9*   MCV 86.8   MCH 29.4   MCHC 33.8   RDW 12.8   NEPRELIM 20.15*   WBC 23.8*   .0       ASSESSMENT/PLAN:  Patient is a 33 year old  female who is s/p  PPD# 2.     Doing well   Continue routine postpartum care  Vitals per routine   Encourage ambulation   Plan for discharge to home today.   Follow up in 6 weeks      Dr. Nilay Lopez MD    [facility]     This note was created by [employer] voice recognition. Errors in content may be related to improper recognition by the system; efforts to review and correct have been done but errors may still exist. Please be advised the primary purpose of this note is for me to communicate medical care. Standard sentence structure is not always used. Medical terminology and medical abbreviations may be used. There may be  grammatical, typographical, and automated fill ins that may have errors missed in proofreading.

## 2024-06-12 NOTE — DISCHARGE SUMMARY
[facility]  part of [facility]    Discharge Summary    Martina Sheikh Patient Status:  Inpatient    1990 MRN [id number]   Location [facility] 3SE Attending Lorena Palmer*   Hosp Day # 2 PCP Krissy Zhu MD     Date of Admission: 6/10/2024    Date of Discharge: 24    Admission Diagnoses: Pregnancy (HCC) at 39w6d     Secondary Diagnosis:   Patient Active Problem List   Diagnosis    Postpartum care following vaginal delivery (HCC)    Third-stage postpartum hemorrhage (HCC)        Primary OB Clinician: [facility]    Hospital Course:     EDC: Estimated Date of Delivery: 24    Gestational Age: 39w6d    Date of Delivery: 6/10/24    Antepartum complications:  Short cervix s/p rescue cerclage.    Delivered By: Dr. Lorena Méndez  Delivery Type:     Tubal Ligation: n/a    Baby: Liveborn male,     Apgars:  1 minute:   8                 5 minutes: 8  Anesthesia: epidural          Intrapartum Complications: None    Postpartum complications: postpartum hemorrhage.     Laceration: 2nd degree    Episiotomy: none    Placenta: spontaneous    Feeding Method: both breast and bottle fed     Rh Immune Globulin Given: no    Rubella Vaccine Given: no        Discharge Plan:   Discharge Condition: Good  Early Discharge:  NO    Discharge medications:  Current Discharge Medication List        New Orders    Details   ibuprofen 600 MG Oral Tab Take 1 tablet (600 mg total) by mouth every 6 (six) hours as needed for Pain.      acetaminophen 325 MG Oral Tab Take 1 tablet (325 mg total) by mouth every 6 (six) hours as needed for Pain.      docusate sodium 100 MG Oral Cap Take 1 capsule (100 mg total) by mouth 2 (two) times daily as needed for constipation.      polyethylene glycol, PEG 3350, 17 g Oral Powd Pack Take 17 g by mouth 2 (two) times daily as needed.           Home Meds - Unchanged    Details   FAMOTIDINE 20 MG Oral Tab TAKE 1 TABLET(20 MG) BY MOUTH TWICE DAILY       magnesium 250 MG Oral Tab Take 1 tablet (250 mg total) by mouth.      Calcium Carbonate 600 MG Oral Tab Take 1 tablet (600 mg total) by mouth.      Prenatal Vit-Fe Fumarate-FA (PRENATAL FA OR) Take by mouth.      acidophilus-pectin Oral Cap Take 1 capsule by mouth daily.      ergocalciferol 1.25 MG (31618 UT) Oral Cap Take 1 capsule (50,000 Units total) by mouth once a week.      APPLE CIDER VINEGAR OR Take by mouth.                   Discharge Diet: As tolerated and General diet    Discharge Activity: Pelvic rest until cleared and As tolerated    Follow up:      Follow-up Information       Lorena Palmer MD. Schedule an appointment as soon as possible for a visit in 6 week(s).    Specialty: OBSTETRICS & GYNECOLOGY  Why: Routine postpartum visit  Contact information:  [address] [address] [phone]                             Follow up Labs: None      Other Discharge Instructions:         Pelvic rest for 6 weeks: nothing in the vagina (tampons, intercourse).   May shower, no bathtubs or swimming.    - Call OB for any concerns:     *Heavy bleeding that saturates one pad per hour for several hours/clots bigger than a golf ball     *Signs of preeclampsia (headache, vision changes, nausea/vomiting)     *Increased pain unrelieved by oral medications     *Anxiety or depression     *Breast concerns     *Fever 100.4 or greater     *Dizziness/fainting     *Calf pain, redness or swelling    Follow up with OB in 6 weeks.     Post Vaginal Delivery Home Care Instructions     We hope you were pleased with your care at [facility].  We wish you the best outcome and overall experience with the delivery of your baby.  These instructions will help to minimize pain, limit the risk for an infection, and improve the likelihood of a successful recovery.    What to Expect:  Abdominal cramping after delivery especially if you are breastfeeding.   Vaginal bleeding for about  4-6 weeks that may be followed by a yellow or white discharge for a few more weeks.  Your period will resume in approximately 6-8 weeks, unless you are breastfeeding.    If you are bottle feeding, you may notice breast engorgement in about 3 days.  Your breast may be sore and hard. Please wear a tight fitted bra or sports bra for 24-36 hours to help prevent your breast from producing milk, and use ice packs to relive any discomfort.  If you are breastfeeding, nipple dryness is very common the first few days.    Constipation is common after having a baby.  Please increase fluid and fiber in your diet.      Over-The-Counter Medication  Non-prescription anti-inflammatory medications can also help to ease the pain.  You may take Aleve, Tylenol or Ibuprofen   Colace or Metamucil for Constipation  Lanolin for dry nipples  Tucks, Witch Hazel and Epifoam for vaginal/perineum discomfort.   Drink a full glass of water with oral medication and take as directed.    Wound Care  The following instructions will promote proper healing and help to prevent infection  Vaginal/perineum Care: Sitz Bath for 15mins, 2-3 times a day,    Bathing/Showers  You may resume showers  No baths, swimming, hot tubs until your post-partum visit    Home Medication  Resume your home medications as instructed    Diet  Resume your normal diet    Activity  Refrain from vaginal intercourse, vaginal suppositories, tampon use or douches until after your post-partum visit.  No exercising for 4 weeks  You may climb stairs minimally for the 1st week.    Do not do heavy housework for at least 2-3 weeks    Return to Work or School  You may return to work in approximately 6 weeks  Contact your obstetrician’s office, if you need a medical release. ([phone])    Driving  Avoid driving if you are taking narcotics for pain relief.    Follow-up Appointment with Your Obstetrician  Call your obstetrician’s office today for an appointment in 4-6 weeks.    The number  is [phone].  Verify your appointment date, day, time, and location.  At your 1st post-partum office visit:  Your progress will be evaluated, findings reviewed, and any additional concerns and instructions will be discussed.    Questions or Concerns  Call your obstetrician’s office if you experience the following:  Severe pain not controlled by pain medication  Foul smelling vaginal discharge  Heavy bleeding  Shortness of breath  Fever  Redness, increased swelling or drainage from your incision  Crying and periods of sadness that prevents you from caring for yourself and your baby  Burning sensation during urination or inability to urinate  Swelling, redness or abnormal warmth to your leg/calf  Please call ([phone]). If your call is made after office hours, a physician will be available to help you.  There is always a provider covering our patients.    Thank you for coming to [facility] to start your new family.  The nurses and the anesthesiologists try very hard to make sure you receive the best care possible.  Your trust in them as well as us is greatly appreciated.    Thanks so much,   The Providers of [facility]           Dr. Nilay Lopez MD    [facility] 10 OBGYN     This note was created by [email or web address] voice recognition. Errors in content may be related to improper recognition by the system; efforts to review and correct have been done but errors may still exist. Please be advised the primary purpose of this note is for me to communicate medical care. Standard sentence structure is not always used. Medical terminology and medical abbreviations may be used. There may be grammatical, typographical, and automated fill ins that may have errors missed in proofreading.

## 2024-06-17 ENCOUNTER — PATIENT MESSAGE (OUTPATIENT)
Dept: OBGYN CLINIC | Facility: CLINIC | Age: 34
End: 2024-06-17

## 2024-06-17 NOTE — TELEPHONE ENCOUNTER
From: Martina Sheikh  To: Lorena Palmer  Sent: 6/17/2024 11:57 AM CDT  Subject: Lactation consultant     Hello, I would like to see a lactation consultant, how do I go about finding one or reaching out to one   Thank you

## 2024-06-18 ENCOUNTER — PATIENT MESSAGE (OUTPATIENT)
Dept: OBGYN CLINIC | Facility: CLINIC | Age: 34
End: 2024-06-18

## 2024-06-18 DIAGNOSIS — R30.0 BURNING WITH URINATION: Primary | ICD-10-CM

## 2024-06-18 NOTE — TELEPHONE ENCOUNTER
Called pt c/o cramping 6-7/10 pain scale, taking ibuprofen every 6 hours. \"Feels like bad period' more with getting up and pumping/breastfeeding.  Informed pt it is normal.  Encouraged 800 mg of Ibuprofen( take 600 mg plus 200 mg) every 8 hours in between tylenol 325 mg x 3= 975 every 8 hours, do not exceed 3,000 mg.  Take ibuprofen 4 hours later tylenol, than 4 hours later ibuprofen etc.  If no relieve, please call the office.  Pt agrees.          From: Martina Sheikh  To: Lorena Palmer  Sent: 6/18/2024  2:49 PM CDT  Subject: Cramping     I woke up last night with some lower abdominal cramping. Is this normal and can I do or take anything for it? I am only 8 days post labor  Thank you

## 2024-06-21 ENCOUNTER — NURSE ONLY (OUTPATIENT)
Dept: LACTATION | Facility: HOSPITAL | Age: 34
End: 2024-06-21
Attending: OBSTETRICS & GYNECOLOGY

## 2024-06-21 ENCOUNTER — LAB ENCOUNTER (OUTPATIENT)
Dept: LAB | Facility: HOSPITAL | Age: 34
End: 2024-06-21
Attending: OBSTETRICS & GYNECOLOGY

## 2024-06-21 DIAGNOSIS — R30.0 BURNING WITH URINATION: ICD-10-CM

## 2024-06-21 DIAGNOSIS — O92.5 SUPPRESSED LACTATION, POSTPARTUM CONDITION OR COMPLICATION (HCC): ICD-10-CM

## 2024-06-21 LAB
BILIRUB UR QL STRIP.AUTO: NEGATIVE
CLARITY UR REFRACT.AUTO: CLEAR
COLOR UR AUTO: COLORLESS
GLUCOSE UR STRIP.AUTO-MCNC: NORMAL MG/DL
KETONES UR STRIP.AUTO-MCNC: NEGATIVE MG/DL
LEUKOCYTE ESTERASE UR QL STRIP.AUTO: 500
NITRITE UR QL STRIP.AUTO: NEGATIVE
PH UR STRIP.AUTO: 6 [PH] (ref 5–8)
PROT UR STRIP.AUTO-MCNC: NEGATIVE MG/DL
SP GR UR STRIP.AUTO: 1.01 (ref 1–1.03)
UROBILINOGEN UR STRIP.AUTO-MCNC: NORMAL MG/DL

## 2024-06-21 PROCEDURE — 87086 URINE CULTURE/COLONY COUNT: CPT

## 2024-06-21 PROCEDURE — 99213 OFFICE O/P EST LOW 20 MIN: CPT

## 2024-06-21 PROCEDURE — 81001 URINALYSIS AUTO W/SCOPE: CPT

## 2024-06-21 NOTE — PATIENT INSTRUCTIONS
[facility]  Our Lactation Consultants are available to continue helping you breastfeed.  To schedule an appointment at our office  Call [phone]    Suggestions to increase milk supply and release to breast pump    Review of your history and treatment of any medical conditions by your physician is also necessary.    Kangaroo mother care: Snuggle with your baby in skin to skin contact.  This helps to wake a sleepy baby and increases your milk supply.     Massage your breasts before nursing or pumping.  Practice relaxation techniques like visual imagery.    Increase the frequency of feedings and/or pumping sessions.    Most babies will feed 8-12 times in 24 hours with some periods of cluster feeding, therefore try to increase pumping frequency to at least 8-12 times every 24 hours.    Keep pumping log with 24 hour collection totals to monitor milk supply.  Once your milk is in (3-5 days post-delivery), pump until the sprays of milk slow to drops and for 1-2 minutes after to obtain the high fat milk.  This for most moms is 10-12 minutes, but pumping times may vary slightly.  A short pumping is better than no pumping!  If you have time you can “cluster pump” like a baby cluster feeds at times - pump for ten minutes, rest for ten minutes, then repeat 2-3 times. Or try pumping every hour for 10 minutes for 2-3 hours.    Pumping should not be painful.   Place flange on your breasts, centering your nipples.    Use correct size flange for your nipple size. Nipple should not rub on inner circumference of flange. If you need a different size flange, contact your nurse or the lactation department.   Maximum comfort suction is recommended. Begin with suction low then increase the suction to the highest suction that is comfortable for you. Remember pumping should never be painful.  If breast milk flow is minimal, try increasing suction if it is comfortable to do so.  NOTE: Initially, in the  colostrum phase amounts vary from a few drops to 30cc (1oz.).  If pumping is painful, turn the pump off, reposition flanges, check that the size is correct for your nipples, and adjust the suction. If nipple pain continues contact the lactation department or your doctor.    Ways to help your milk let down (flow) to the pump:   Massage your breasts for a few minutes prior to pumping and massage again if the milk flow slows down during the pumping session.   Hand expression of milk before, during and after pumping may allow you to obtain more milk than the pump alone.   When milk flow slows, increasing pump speed  back to 80 cpm ([employer]) or switching pump back to “stimulation” phase (Medela pumps) to stimulate further milk ejection reflexes. Then decrease speed once milk begins to flow again.   Pump after you’ve seen, held, or touched your baby. Discuss “kangaroo care” with your baby’s nurse - holding your baby skin-to-skin on your chest when your baby is able.  Pump with baby close by or have a picture of your baby to look at while you pump  Inhale the scent of your baby from something your baby wore.  Sit back, close your eyes and imagine how sweet and soft your baby is; imagine “flowing things” like waterfalls, white rivers.  Listen to relaxing music. There is relaxation/meditation CD/tapes made especially for mothers pumping their breast milk.   Have a nice tall glass of water, juice, or milk close by to quench your thirst.  View the [facility] website videos: Maximizing Milk Production and Hand Expression   [email or web address].[email or web address]    Include night feedings and/or pumping sessions. Your hormone levels are higher at night.    Increasing milk flow to baby if breastfeeding:   Improve your baby’s position and latch.  Positioning:   Your hand at neck/shoulders, not the back of head.   Line chin with the bottom of your areola  Latching on:  Express drops of milk  onto your baby’s lips to encourage licking.  Point your nipple to baby’s nose and stroke lightly down the center of lips.  Wait for wide mouth with tongue cupped at bottom of mouth.  Chin should be deep into breast, with some room between nose and the breast.   If needed, gently draw chin down lower to deepen latch.  Compressing the breast when your baby sucks can increase milk flow.  Swallowing with most sucks (every 1-3 sucks) until satisfied at least 8-12 times every 24 hours.    Additional recommendations can be made on an individual basis depending on needs.  If you would like to meet with one of the Lactation Consultants while visiting your baby, you can request a visit by calling [phone] or notify your baby’s nurse for arrangements to be made. Breastfeeding suggestions when supplements may be needed    Kangaroo mother care: Snuggle your baby between your breasts in just a diaper and covered with a blanket. Helps to wake a sleepy baby and increases your milk supply.     Massage your breasts before nursing or pumping.    Breastfeed with hunger cues, most babies will breastfeed 8-12 times every 24 hours with some cluster feeding, especially during growth spurts. Gently wake by 2-3 hours to feed if sleepy.    Positioning:   Your hand at neck/shoulders, not the back of head.   Line chin with the bottom of your areola    Latching on:  Express drops of milk onto your baby's lips to encourage licking.  Point your nipple to baby's nose  Stroke nipple lightly down center of lips  Wait for wide mouth with tongue cupped at bottom of mouth.  Chin should be deep into breast, with some room between nose and the breast.   If needed, gently draw chin down lower to deepen latch.    Is baby taking enough breastmilk?  Swallowing with most sucks (every 1-3 sucks) until satisfied at least 8-12 times every 24 hours.  Compressing the breast when your baby sucks can increase milk flow.  At least 6-8 wet diapers and at least 3-4  soft, yellow seedy stools every 24 hours. Use breastfeeding journal.  Weight gain of at least 4-7 ounces per week    Supplementation:   If not meeting these guidelines for adequate breastfeeding, feed 1-2 oz or more expressed milk or formula with a wide based, slow flow nipple or the SNS (supplemental nursing system).     Paced bottle feeding using a slow flow nipple:   Hold your baby in an upright position, supporting the hand and neck with your hand, rather than in the crook of your arm.   Let you baby \"latch on\" to bottle: stroke nipple down from top lip to bottom, licking is good, wait for wide mouth, tongue cupped at bottom of mouth.  Tip the bottle up just far enough that there is not air in the nipple.  Pausing mimics breastfeeding and discourages \"guzzling\" the feeding, allowing infant to take at least 15 minutes to drink the breastmilk or formula.     Milk Supply:   ontinue breast massage before nursing and pumping, skin to skin contact with baby as much as possible.   Continue to pump one or both breasts for 10-15 minutes every 2-3 hours after nursing. (at least 8x/24 hours). A hospital grade rental breast pump is recommended.   If milk supply is not responding to above measures within the week:  Discuss use of herbs such as fenugreek  or medications such as reglan or domperidone with your OB physician and baby's physician.  Discuss thyroid check with OB physician         Prevent plugged ducts and mastitis  Watch for signs of breast infection (mastitis) - painful breast, reddened area, fever, chills or flu-like symptoms - call your OB doctor at once if this occurs.     Follow-up:  Call lactation [phone] in 1-2 days and as needed.   Schedule follow-up lactation consult within week and as needed.   Appointment with baby's doctor planned        Call you baby's doctor with questions as well.    Weight check sooner if wet or stool diapers decrease. Have weight checked again within 1-3 days of  decreasing/stopping supplements.     For additional information: [employer] website  www.llli.org

## 2024-06-21 NOTE — TELEPHONE ENCOUNTER
RN spoke to pt. Pt reports blood-milky pus-like substance running down her leg from her vagina. Pt also having burning with urination and says that it hurts to empty her bladder. Pt denies back pain, fever. Pt says that her cramping has improved and her bleeding is starting to subside. RN told pt that RN would speak to on-call provider and would call her back. Pt verbalized understanding and agreed with plan of care.

## 2024-07-23 ENCOUNTER — NURSE ONLY (OUTPATIENT)
Dept: LACTATION | Facility: HOSPITAL | Age: 34
End: 2024-07-23
Attending: OBSTETRICS & GYNECOLOGY
Payer: COMMERCIAL

## 2024-07-23 ENCOUNTER — LACTATION ENCOUNTER (OUTPATIENT)
Dept: LACTATION | Facility: HOSPITAL | Age: 34
End: 2024-07-23

## 2024-07-23 VITALS — TEMPERATURE: 99 F

## 2024-07-23 PROCEDURE — 99213 OFFICE O/P EST LOW 20 MIN: CPT

## 2024-07-23 NOTE — LACTATION NOTE
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 She has been BF and supplementing with EBM/formula. Infant has been having difficulty BF, has a disorganized suck with a biting/chomping motion, popping on and off the breast and having difficulty maintaining a seal with both BF/bottle feeding.  Pt has been avoiding caffeine and carbonated beverages. Infant was seen by the pediatrician today and ped enc pt to go dairy free, changed infant's formula to alimentum and started infant on probiotics.  suspects infant may have possible oral restrictions contributing to difficulty sustaining a latch and recommended infant be seen by a speech/myofunctional therapist for an evaluation  Pt reports her milk supply is lower from L breast. Reviewed ways to increase her milk supply. Pt reported pumping when she is able to, but infant has been very fussy, especially at night and she was not able to pump last night. Pumping between feeds and also latching regularly.  Plan in place to continue triple feed as able; offer breast as able, pump and supplement.  Baby transferred 84 mL with breastfeeding and was supplemented with an additional 105 ml EBM/Formula.  recommended pt contact infant's pediatrician for a referral for speech therapy evaluation.    Maternal history  Maternal history: AMA;Polycystic ovarian syndrome (PCOS);PPH;Anemia  Other/comment: short cervix-cerclage placed at 21 weeks, removed at 32 wks, GBS+, 4 doses of ampicillin given in labor, PPH-- TXA, methergine and Janet,    Breastfeeding goal  Breastfeeding goal: To maintain breast milk feeding per patient goal    Maternal Assessment  Bilateral Breasts: Full;Asymmetrical (Right side slightly larger than Left  side)  Bilateral Nipples: Everted  Prior breastfeeding experience (comment below): Primip  Breastfeeding Assistance: Breast exam provided with permission;Hand expression provided with permission;Breastfeeding assistance provided with permission;Pumping assistance provided with permission    Pain assessment  Pain, additional: Pain location  Pain Location: Nipples  Pain scale comment: occasional nipple soreness  Treatment of Sore Nipples: Deeper latch techniques;Expressed breast milk    Guidelines for use of:  Equipment:  (nipple butter, silverettes)  Breast pump type: Spectra  Current use of pump:: pumped 6 times in the past 24 hrs  Suggested use of pump: Pump 8-12X/24hr;Pump each time a supplement is offered  Reported pumping volumes (ml):   Post-feed pumped volume: 30  Other (comment): Assisted with guiding infant to a deeper latch and supporting infant to relatch as infant pops on and off the breast. MAURY assisted pt with using the NS during BF, but no noticable difference was seen with BF when pt used the nipple shield. Pt reported her L breast has a lower milk supply and is smaller than her R breast. Reviewed ways to increase her milk supply, double pumping for 15-20 mins q 3 hr and then pumping her L breast an additional 5 mins to provided more stimulation and release more breast milk. Discussed cluster pumping. Pt brought her spectra breast pump to the visit, reviewed pump settings, pt denied discomfort with pumping. Reviewed proper fit of flanges.  recommended pumping with the 25 mm flange. Pt's questions were answered and pt will try to eliminate dairy from her diet and supplementing with alimentum formula as recommended by the pediatrician. Instructed pt to contact pediatrician with feeding difficulties or concerns.

## 2024-07-23 NOTE — LACTATION NOTE
This note was copied from a baby's chart.  LACTATION NOTE - INFANT    Evaluation Type  Evaluation Type: Outpatient Follow Up    Problems & Assessment  Problems Diagnosed or Identified: Infant feeding problem;Disorganized suck;Reflux symptoms (Possible oral restrictions)  Problems: comment/detail: Mother presented to the [facility] with her infant, Aryan. She reported she had been to the pediatrician earlier today because infant has been fussy and concerned that infant may have reflux and or colic. Aryan weighed 10 lbs 11.2 oz today at 6 weeks old. Mother has a hx PPH after delivery, with a low milk supply. Mother has been BF and supplementing with EBM/formula. Infant has a disorganized suck with a biting/chomping motion, popping on and off the breast and having difficulty maintaining a seal with both BF/bottle feeding. Mother has been avoiding caffeine and carbonated beverages. plans to avoid dairy in her diet as recommended by pediatrician. Mother reported ped recommended probiotics and changed formula to alimentum. LC suspects infant may have both a disorganized suck and possible oral restrictions contributing to infant's difficulty sustaining a latch. LC recommended mother contact infant's pediatrician for a referral for speech therapy evaluation.  Infant Assessment: Anterior fontanel soft and flat;Abdomen soft, non-distended;Bowel sounds active;Good skin turgor;Hunger cues present;Oral mucous membranes moist;Skin color: pink or appropriate for ethnicity  Muscle tone: Appropriate for GA    Feeding Assessment  Summary Current Feeding: Breastfeeding with breast milk supplement;Breastfeeding with formula supplement  Last 24 hour feeding summary: BF 6, Bottles 6 (3-4 oz)  Breastfeeding Assessment: Assisted with breastfeeding w/mother's permission;Deep latch achieved and observed;PO supplement followed breastfeeding  Breastfeeding lasted # of minutes: 35  Breastfeeding Positions: right breast;left breast;cross  cradle;cradle  Latch: Repeated attempts, hold nipple in mouth, stimulate to suck  Audible Sucks/Swallows: Spontaneous and intermittent (24 hours old)  Type of Nipple: Everted (after stimulation)  Comfort (Breast/Nipple): Soft/non-tender  Hold (Positioning): Full assist, teach one side, mother does other, staff holds  LATCH Score: 8  Other (comment): Assisted with guiding infant to a deeper latch and supporting infant to relatch as infant pops on and off the breast. LC assisted mother with using the NS during BF, but no noticable difference was seen with BF when infant used the nipple shield. Infant latched directly on mother's R breast. Mother reports at times it feels like her infant  is biting when sucking. Infant was fussy with BF, latching on and off, seemed to be swallowing air when coming off the breast and crying. Infant transferred 84 ml (66 ml from the R breast and 18 ml-using the NS from the L breast).  Infant had a white emesis of milk (8ml) in between breasts when burped. Infant was very fussy after BF. LC bottle fed infant 30 ml of EBM and 75 ml of alimentum formula. Infant had a biting/chomping motion with bottle feeding. Infant had difficulty maintaining suction on the bottle nipple and LC could easily withdraw the bottle nipple from infant's mouth when he was sucking. Reviewed feeding plan to continue triple feedings. Offer paced bottle feeding, frequent burps and keeping infant upright for 30 mins after feedings. LC reinforced  following pediatrician's recomendations from pediatrician's appt today. Contact pediatrician for a referral for a speech therapy consult. Call pediatrician if symptoms are worsening, concerns develop or recommendations are not working. Call LC to schedule a f/u LC OPV prn.    Output  # Voids in 24 hours: 10  # Stools in 24 hours: 6-8  # Emesis in 24 hours: 2    Pre/Post Weights  Pre-Weight Right Breast (g): 4852  Post-Weight Right Breast (g): 4918  ml of milk, RT Brst:  66  Pre-Weight Left Breast (g): 4910 (Emesis after BF on 1st side, reweighed (down 8 ml) used NS on the 2nd breast)  Post-Weight Left Breast (g): 4928  ml of milk, LT Brst: 18  ml of milk, total: 84  Supplement Type: EBM/Formula  Supplement Type (other): 30 ml EBM, 45 ml Alimentun Formula    Equipment used  Equipment used: Nipple Shield  Nipple shield size: 20 mm

## 2024-07-23 NOTE — PATIENT INSTRUCTIONS
Aryan weighed 10 lbs 4.1 oz today at 6 weeks old     Your baby had difficulty breastfeeding, popping on and off the breast. We used a 20 mm nipple shield on the L breast to see if it helped him maintain the seal when breastfeeding. During the Breastfeeding assessment, LC did not notice a significant difference. He had a disorganized suck with a biting,chomping motion. He transferred 84 ml of milk with Breastfeeding and was supplemented with an additional 30ml of Expressed breast milk and 75 ml of Formula. He was fussy during the breastfeeding session and had a spit up in between breastfeeding.    I am recommending your baby be seen by a speech/myofunctional therapist for a sucking evaluation. Call your pediatrician and discuss this recommendation and obtain a referral    Feed Aryan on demand and wake him up by 2-4 hrs for feedings. Offer Breastfeeding first and then supplement him with 2+ oz of Expressed breast milk/formula to his satiety. Burp him frequently and keep him upright after Breastfeeding    If you choose to use a Nipple Shield with your Breastfeeding Attempts, please follow the guidelines for weekly weight checks.    Breastfeeding suggestions when supplements are needed     Kangaroo mother care: Snuggle your baby between your breasts in just a diaper and covered with a blanket. Helps to wake a sleepy baby and increases your milk supply.      Massage your breasts before nursing or pumping.     Breastfeed with hunger cues, most babies will breastfeed 8-12 times every 24 hours with some cluster feeding, especially during growth spurts. Gently wake by 2-4 hours to feed if sleepy.     Positioning:   Your hand at neck/shoulders, not the back of head.   Line chin with the bottom of your areola     Latching on:  Express drops of milk onto your baby's lips to encourage licking.  Point your nipple to baby's nose  Stroke nipple lightly down center of lips  Wait for wide mouth with tongue cupped at bottom of  mouth.  Chin should be deep into breast, with some room between nose and the breast.   If needed, gently draw chin down lower to deepen latch.     Is baby taking enough breastmilk?  Swallowing with most sucks (every 1-3 sucks)   Compressing the breast when your baby sucks can increase milk flow.  At least 6-8 wet diapers and at least 3-4 soft, yellow seedy stools every 24 hours. Use breastfeeding journal.  Weight gain of at least 4-7 ounces per week     Supplementation:   If not meeting these guidelines for adequate breastfeeding, feed 1-2 oz or more expressed milk or formula with a wide based, slow flow nipple.     Paced bottle feeding using a slow flow nipple:   Hold your baby in an upright position, supporting the hand and neck with your hand, rather than in the crook of your arm.   Let you baby \"latch on\" to bottle: stroke nipple down from top lip to bottom, licking is good, wait for wide mouth, tongue cupped at bottom of mouth.  Tip the bottle up just far enough that there is not air in the nipple.  Pausing mimics breastfeeding and discourages \"guzzling\" the feeding, allowing infant to take at least 15 minutes to drink the breastmilk or formula.      Milk Supply:   Massage your breasts before nursing and pumping, skin to skin contact with baby as much as possible.   Pump both breasts for 20 minutes every 2-3 hours after nursing. (at least 6-8x/24 hours).   Add an additional 5 mins of pumping your Left breast with the hands on pumping technique to help increase your milk supply from your Left breast  If milk supply is not responding to above measures within the week:  Discuss thyroid check with OB physician      Prevent plugged ducts and mastitis  Watch for signs of breast infection (mastitis) - painful breast, reddened area, fever, chills or flu-like symptoms - call your OB doctor at once if this occurs.       Weight check sooner if wet or stool diapers decrease. Have weight checked again within 1-3 days of  decreasing/stopping supplements.       Call the [facility] at [phone] with breastfeeding questions as needed.  Call your pediatrician with any feeding difficulties or concerns or Aryan's symptoms continue after following pediatrician's recommendations.   Keep your pediatrician appointment as scheduled.   Call to schedule an appointment for a follow up visit after being seen by speech therapy.  Call sooner as needed.    For additional information: [employer] website  [email or web address]     Guidelines for Using a Nipple Shield    Refer to the ’s instructions. These are additional suggestions only.  This thin silicone nipple shield (size 20 mm) was used to assist your baby to latch on to the breast or for protection of your nipples while your baby learns to breastfeed correctly.  Use of the shield is considered temporary, allowing you to begin breastfeeding your baby. Some infants have  successfully using the shield for longer periods, however, checking your infant’s weight regularly is recommended to assure adequate growth while using the nipple shield.    Cautions regarding nipple shield use:  The use of a nipple shield may decrease your milk supply and could decrease the amount of milk your baby receives.  Careful follow-up, including weight checks, with your lactation consultant and baby’s health care provider is important.   Do not use a different nipple shield.     Before feeding your baby:  Apply warm, moist heat to your breasts for a few minutes to increase milk flow.  Rinse the shield in warm water to make it          softer and easier to adhere to the breast.  Apply nipple shield correctly:               Hold the shield by the rim, turn it inside-out nursing home. Place the shield, centered over the                 nipple and flip the shield right side out, drawing your nipple and areola into the                shield as much as possible.  Massage breasts and if  possible, hand express  some breastmilk into the nipple shield.     Latching your baby on to the breast:  Stroke your baby’s lower lip with the nipple of the shield. Wait for your baby to open wide like a “yawn,” with the tongue down and out over the lower lip. Bring baby’s mouth directly over the shield. It may take a few attempts before your baby settles into a rhythmical suckling pattern.  After several minutes of regular swallowing at the breast, you may want to try to reattach your baby to your breast without the shield.  When your baby’s swallowing slows on the first side, repeat this process on the other breast.   If needed, trickle drops of your expressed milk or formula onto the nipple to encourage your baby to latch on. If your baby becomes upset or has not latched on within 20 minutes, stop and feed your baby using another method.    Signs of correct, effective suckling include:  Your baby swallows with nearly every suck (this is called nutritive suckling: swallowing every 1-3 sucks)  Your baby sustains nutritive suck and swallow pattern for at least 15-30 minutes, offer both breasts at each feeding.  Your nipples are not painful during the feeding.  Your baby is content after most feedings.  Your baby has adequate diapers (at least 6-8 wet and 3-4 loose, yellow stools every 24 hours by the 4th day of life) AND gains at least 4-8 ounces per week (one-half to one ounce per day). Use the breastfeeding journal to record your baby’s feedings and diapers.    Is a supplement needed?  If your baby does not latch on, or swallows less than 15-30 minutes at a feeding - swallowing after most sucks (at least every 1-3 sucks), feed your baby additional expressed breastmilk or formula by  wide base slow flow bottle with 1 to 2 +oz to satisfaction.                 After feeding your baby:  Pump your breasts for 10-15 minutes using a hospital grade rental breast pump, after most daytime feedings. This may help maintain  adequate milk supply while using the shield. If your baby is not latching on yet, pump at least 8-12 times each 24 hours.  Wash the nipple shield in hot soapy water, rinse and air dry. The shield may be boiled.     Follow-up is VERY important!  Let your baby’s health care provider know immediately if it is difficult for you to feed your baby or if the number of wet or stool diapers is inadequate.   Follow-up visits with your lactation consultant and baby’s health care provider are necessary when using the shield.   Your baby’s weight should be checked 1-2 times each week while using a nipple shield.

## 2024-07-25 ENCOUNTER — POSTPARTUM (OUTPATIENT)
Dept: OBGYN CLINIC | Facility: CLINIC | Age: 34
End: 2024-07-25
Payer: COMMERCIAL

## 2024-07-25 VITALS
DIASTOLIC BLOOD PRESSURE: 74 MMHG | HEIGHT: 66 IN | SYSTOLIC BLOOD PRESSURE: 122 MMHG | WEIGHT: 209 LBS | BODY MASS INDEX: 33.59 KG/M2

## 2024-07-25 DIAGNOSIS — Z30.09 FAMILY PLANNING: ICD-10-CM

## 2024-07-25 PROCEDURE — 3074F SYST BP LT 130 MM HG: CPT | Performed by: OBSTETRICS & GYNECOLOGY

## 2024-07-25 PROCEDURE — 3008F BODY MASS INDEX DOCD: CPT | Performed by: OBSTETRICS & GYNECOLOGY

## 2024-07-25 PROCEDURE — 3078F DIAST BP <80 MM HG: CPT | Performed by: OBSTETRICS & GYNECOLOGY

## 2024-07-25 RX ORDER — ETONOGESTREL AND ETHINYL ESTRADIOL VAGINAL RING .015; .12 MG/D; MG/D
RING VAGINAL
Qty: 3 EACH | Refills: 3 | Status: SHIPPED | OUTPATIENT
Start: 2024-07-25

## 2024-07-25 NOTE — PROGRESS NOTES
GYN H&P     2024  9:16 AM    CC: Patient is here for 6 W PP visit. Needs pap.     HPI: Patient is a 33 year old  for above. Concerned about milk supply. Also asking if PP hemorrhage could have been prevented.     Breast feeding  Birth control    Last Postpartum Depression Scale  I have been able to laugh and see the funny side of things: As much as I always could  I have looked forward with enjoyment to things: As much as I ever did  I have blamed myself unnecessarily when things went wrong: No, never  I have been anxious or worried for no good reason: No, not at all  I have felt scared or panicky for no good reason: No, not at all  Things have been getting on top of me: No, most of the time I have coped quite well  I have been so unhappy that I have had difficulty sleeping: Not at all  I have felt sad or miserable: No, not at all  I have been so unhappy that I have been crying: No, never  The thought of harming myself has occurred to me: Never  Total: 1        Patient's last menstrual period was 2023 (exact date).    OB History    Para Term  AB Living   1 1 1 0 0 1   SAB IAB Ectopic Multiple Live Births   0 0 0 0 1      # Outcome Date GA Lbr Dany/2nd Weight Sex Type Anes PTL Lv   1 Term 06/10/24 [id number] 14:00 / 02:45 9 lb 6.6 oz (4.27 kg) M NORMAL SPONT EPI N CARLOS      Complications: Group B beta strep +, Late decelerations, Variable decelerations       GYN hx:             Past Medical History:    HPV in female    PCOS (polycystic ovarian syndrome)     Past Surgical History:   Procedure Laterality Date    Hc cervical cerclage  2024     No Known Allergies  Family History   Problem Relation Age of Onset    No Known Problems Father     High Cholesterol Mother     Asthma Maternal Grandmother     Cancer Maternal Grandmother         lung smoker    No Known Problems Maternal Grandfather     Other (altzhirmer) Paternal Grandmother     Dementia Paternal Grandmother     Mental Disorder  Sister     Other (Multiple sclerosis) Sister 22    No Known Problems Brother     Breast Cancer Neg     Colon Cancer Neg     Ovarian Cancer Neg      Social History     Socioeconomic History    Marital status:    Occupational History    Occupation: manager   Tobacco Use    Smoking status: Never     Passive exposure: Never    Smokeless tobacco: Never   Vaping Use    Vaping status: Never Used   Substance and Sexual Activity    Alcohol use: Not Currently    Drug use: Not Currently    Sexual activity: Yes     Partners: Male     Social History     Social History Narrative    Live with boyfriend       Medications reviewed. See active list.     /74   Ht 66\"   Wt 209 lb (94.8 kg)   LMP 08/05/2023 (Exact Date)   Breastfeeding Yes   BMI 33.73 kg/m²       Exam:   GENERAL: well developed, well nourished, in no apparent distress  SKIN: no rashes, no suspicious lesions  HEENT: normal  NECK: supple; no thyroidmegaly, no adenopathy  BREASTS: symmetrical, nontender, no palpable masses or nodes, no nipple discharge, no skin changes, no dippling, no palpable axillary adenopathy  ABDOMEN: Soft, non distended; non tender, no masses.  Liver and spleen non-tender, no enlargement. No palpable hernias  GYNE/:  External Genitalia: Normal appearing, no lesions, normal hair distribution   Urethral meatus appear wnl, no abnormal discharge or lesions noted.   Bladder: well supported, urethra wnl, no palpable tenderness or masses, no discharge  Vagina: normal pink mucosa, no lesions, normal clear discharge.   Uterus: midline, mobile, non-tender, firm and smooth  Cervix: pink, no lesions grossly visible, no discharge  Adnexa: non tender, no palpable masses, normal size  Anus:  No lesions or visible hemorrhoids        A/P: Patient is 33 year old female     1. Postpartum care following vaginal delivery ([facility])    2. Family planning  - Etonogestrel-Ethinyl Estradiol (NUVARING) 0.12-0.015 MG/24HR Vaginal Ring; 1 per vagina for 3 weeks  and 1 week out  Dispense: 3 each; Refill: 3    DW pt PP hemorrhage cannot be prevented but only treated well.   DW her birth control option. She would like RX for nuvaring if she stops breast feeding.       Lorena Palmer MD

## 2024-11-01 ENCOUNTER — NURSE TRIAGE (OUTPATIENT)
Dept: FAMILY MEDICINE CLINIC | Facility: CLINIC | Age: 34
End: 2024-11-01

## 2024-11-01 NOTE — TELEPHONE ENCOUNTER
Action Requested: Summary for Provider     []  Critical Lab, Recommendations Needed  [] Need Additional Advice  []   FYI    []   Need Orders  [] Need Medications Sent to Pharmacy  []  Other     SUMMARY:  left wrist/thumb numbness.     Patient states she thinks IV was not in correct spot when in labor in June \"it didn't feel right and felt tingly\"     Patient states she has noted since then intermittent numbness in left wrist and left thumb. \"I noticed it is getting worse when I  the baby\"    Patient denies: weakness, discoloration to site.     Patient prefers Dr Zhu, ok to use RES slot?  Please advise.       Reason for call: No chief complaint on file.  Onset: Data Unavailable                     Reason for Disposition   Numbness or tingling in one or both hands is a chronic symptom (recurrent or ongoing problem lasting > 4 weeks)    Protocols used: Neurologic Deficit-A-OH

## 2024-11-08 ENCOUNTER — OFFICE VISIT (OUTPATIENT)
Dept: FAMILY MEDICINE CLINIC | Facility: CLINIC | Age: 34
End: 2024-11-08
Payer: COMMERCIAL

## 2024-11-08 VITALS
BODY MASS INDEX: 34.26 KG/M2 | HEIGHT: 66 IN | WEIGHT: 213.19 LBS | SYSTOLIC BLOOD PRESSURE: 106 MMHG | HEART RATE: 71 BPM | DIASTOLIC BLOOD PRESSURE: 67 MMHG

## 2024-11-08 DIAGNOSIS — M77.8 LEFT WRIST TENDONITIS: Primary | ICD-10-CM

## 2024-11-08 PROCEDURE — 3074F SYST BP LT 130 MM HG: CPT | Performed by: STUDENT IN AN ORGANIZED HEALTH CARE EDUCATION/TRAINING PROGRAM

## 2024-11-08 PROCEDURE — 3008F BODY MASS INDEX DOCD: CPT | Performed by: STUDENT IN AN ORGANIZED HEALTH CARE EDUCATION/TRAINING PROGRAM

## 2024-11-08 PROCEDURE — 99213 OFFICE O/P EST LOW 20 MIN: CPT | Performed by: STUDENT IN AN ORGANIZED HEALTH CARE EDUCATION/TRAINING PROGRAM

## 2024-11-08 PROCEDURE — G2211 COMPLEX E/M VISIT ADD ON: HCPCS | Performed by: STUDENT IN AN ORGANIZED HEALTH CARE EDUCATION/TRAINING PROGRAM

## 2024-11-08 PROCEDURE — 3078F DIAST BP <80 MM HG: CPT | Performed by: STUDENT IN AN ORGANIZED HEALTH CARE EDUCATION/TRAINING PROGRAM

## 2024-11-08 RX ORDER — PREDNISONE 20 MG/1
40 TABLET ORAL DAILY
Qty: 10 TABLET | Refills: 0 | Status: SHIPPED | OUTPATIENT
Start: 2024-11-08 | End: 2024-11-13

## 2024-11-08 NOTE — PROGRESS NOTES
HPI:    Patient ID: Martinaarik Sheikh is a 34 year old female.    HPI  Pt presenting with Left wrist pain.    Notes onset of Left wrist discomfort during labor  PIV placed on Left radial wrist  Endorses tingling after grabbing bars during pushing  Continues to have ongoing discomfort  Occasional radiating towards elbow  Recent thumb weakness with lifting child  Works in retail, aggravated by tasks  Right handed  No pain meds  breastfeeding      Review of Systems   A comprehensive 10 point review of systems was completed.  Pertinent positives and negatives noted in the the HPI.       Current Outpatient Medications   Medication Sig Dispense Refill    predniSONE 20 MG Oral Tab Take 2 tablets (40 mg total) by mouth daily for 5 days. 10 tablet 0    Prenatal Vit-Fe Fumarate-FA (PRENATAL FA OR) Take by mouth.      acidophilus-pectin Oral Cap Take 1 capsule by mouth daily.      APPLE CIDER VINEGAR OR Take by mouth.       Allergies:Allergies[1]   Vitals:    11/08/24 1301   BP: 106/67   Pulse: 71   Weight: 213 lb 3.2 oz (96.7 kg)   Height: 5' 6\" (1.676 m)       Body mass index is 34.41 kg/m².   PHYSICAL EXAM:   Physical Exam  Vitals reviewed.   Constitutional:       General: She is not in acute distress.  Eyes:      Conjunctiva/sclera: Conjunctivae normal.   Cardiovascular:      Rate and Rhythm: Normal rate.      Pulses: Normal pulses.   Pulmonary:      Effort: Pulmonary effort is normal. No respiratory distress.   Musculoskeletal:      Comments: +Finkelstei   Neurological:      Mental Status: She is alert and oriented to person, place, and time. Mental status is at baseline.   Psychiatric:         Mood and Affect: Mood normal.         Behavior: Behavior normal.              ASSESSMENT/PLAN:   1. Left wrist tendonitis  - provided with gentle stretching/strengthening exercises  - encouraged to increase hydration and rest as able  - steroid burst followed byTylenol/NSAIDs as needed, ice application  - provided with wrist splint --  encouraged use during the day to prevent exacerbating movements  - to call with any questions or concerns  - consider PT vs Ortho for persistent symptoms  - predniSONE 20 MG Oral Tab; Take 2 tablets (40 mg total) by mouth daily for 5 days.  Dispense: 10 tablet; Refill: 0  - Wrist splint    Pt verbalized understanding and agrees with plan.    No orders of the defined types were placed in this encounter.      Meds This Visit:  Requested Prescriptions     Signed Prescriptions Disp Refills    predniSONE 20 MG Oral Tab 10 tablet 0     Sig: Take 2 tablets (40 mg total) by mouth daily for 5 days.       Imaging & Referrals:  [facility] WRIST SPLINT         ID#[id number]         [1] No Known Allergies

## 2025-01-03 ENCOUNTER — OFFICE VISIT (OUTPATIENT)
Dept: FAMILY MEDICINE CLINIC | Facility: CLINIC | Age: 35
End: 2025-01-03
Payer: COMMERCIAL

## 2025-01-03 VITALS
SYSTOLIC BLOOD PRESSURE: 103 MMHG | WEIGHT: 215 LBS | BODY MASS INDEX: 34.55 KG/M2 | HEIGHT: 66 IN | HEART RATE: 73 BPM | DIASTOLIC BLOOD PRESSURE: 68 MMHG

## 2025-01-03 DIAGNOSIS — M77.8 LEFT WRIST TENDONITIS: ICD-10-CM

## 2025-01-03 DIAGNOSIS — Z00.00 WELL ADULT EXAM: Primary | ICD-10-CM

## 2025-01-03 DIAGNOSIS — N92.6 IRREGULAR MENSES: ICD-10-CM

## 2025-01-03 DIAGNOSIS — E55.9 VITAMIN D DEFICIENCY: ICD-10-CM

## 2025-01-03 PROCEDURE — 3078F DIAST BP <80 MM HG: CPT | Performed by: STUDENT IN AN ORGANIZED HEALTH CARE EDUCATION/TRAINING PROGRAM

## 2025-01-03 PROCEDURE — 3008F BODY MASS INDEX DOCD: CPT | Performed by: STUDENT IN AN ORGANIZED HEALTH CARE EDUCATION/TRAINING PROGRAM

## 2025-01-03 PROCEDURE — 3074F SYST BP LT 130 MM HG: CPT | Performed by: STUDENT IN AN ORGANIZED HEALTH CARE EDUCATION/TRAINING PROGRAM

## 2025-01-03 PROCEDURE — 99395 PREV VISIT EST AGE 18-39: CPT | Performed by: STUDENT IN AN ORGANIZED HEALTH CARE EDUCATION/TRAINING PROGRAM

## 2025-01-03 RX ORDER — METFORMIN HYDROCHLORIDE 500 MG/1
500 TABLET, EXTENDED RELEASE ORAL 2 TIMES DAILY WITH MEALS
Qty: 180 TABLET | Refills: 3 | Status: SHIPPED | OUTPATIENT
Start: 2025-01-03

## 2025-01-03 NOTE — PROGRESS NOTES
HPI:    Patient ID: Martinaarik Sheikh is a 34 year old female.    HPI  Pt presenting for routine physical exam. Denies any acute issues or recent illnesses. No significant chronic medical problems. Past medical/surgical history, family history, and social history were reviewed.     S/p  6/10/2024  Weaned BF  Doing well  Mood stable, denies SH/SI/HI    H/o wrist tendonitis  Still wearing brace      Review of Systems   A comprehensive 10 point review of systems was completed.  Pertinent positives and negatives noted in the the HPI.       Current Outpatient Medications   Medication Sig Dispense Refill    metFORMIN  MG Oral Tablet 24 Hr Take 1 tablet (500 mg total) by mouth 2 (two) times daily with meals. 180 tablet 3    acidophilus-pectin Oral Cap Take 1 capsule by mouth daily.      APPLE CIDER VINEGAR OR Take by mouth.       Allergies:Allergies[1]   Vitals:    25 1449   BP: 103/68   Pulse: 73   Weight: 215 lb (97.5 kg)   Height: 5' 6\" (1.676 m)       Body mass index is 34.7 kg/m².   PHYSICAL EXAM:   Physical Exam  Vitals reviewed.   Constitutional:       General: She is not in acute distress.     Appearance: Normal appearance. She is well-developed.   HENT:      Head: Normocephalic and atraumatic.      Right Ear: Tympanic membrane, ear canal and external ear normal.      Left Ear: Tympanic membrane, ear canal and external ear normal.   Eyes:      Conjunctiva/sclera: Conjunctivae normal.   Neck:      Thyroid: No thyroid mass or thyroid tenderness.   Cardiovascular:      Rate and Rhythm: Normal rate and regular rhythm.      Pulses: Normal pulses.      Heart sounds: Normal heart sounds, S1 normal and S2 normal. No murmur heard.  Pulmonary:      Effort: Pulmonary effort is normal. No respiratory distress.      Breath sounds: Normal breath sounds. No wheezing, rhonchi or rales.   Abdominal:      General: Bowel sounds are normal.      Palpations: Abdomen is soft.      Tenderness: There is no abdominal  tenderness. There is no guarding or rebound.   Musculoskeletal:      Cervical back: Normal range of motion and neck supple. No muscular tenderness.      Right lower leg: No edema.      Left lower leg: No edema.   Lymphadenopathy:      Cervical: No cervical adenopathy.   Skin:     General: Skin is warm and dry.      Coloration: Skin is not jaundiced.   Neurological:      General: No focal deficit present.      Mental Status: She is alert and oriented to person, place, and time. Mental status is at baseline.   Psychiatric:         Attention and Perception: Attention normal.         Mood and Affect: Mood normal.         Behavior: Behavior normal. Behavior is cooperative.         Cognition and Memory: Cognition normal.             ASSESSMENT/PLAN:   1. Well adult exam  Discussed preventative screenings  - pap 5/2023  - will check labs as below  - encouraged to continue diet/exercise for overall wellness  - follow-up with eye doctor annually  - follow-up with dentist every 6 months  - return yearly for physicals  - annual flu shot  - tetanus booster every 10yrs  - TSH W Reflex To Free T4; Future  - CBC, Platelet; No Differential; Future  - Comp Metabolic Panel (14); Future  - Hemoglobin A1C; Future  - Lipid Panel; Future    2. Irregular menses  Ok to restart Metformin dosing for now  - metFORMIN  MG Oral Tablet 24 Hr; Take 1 tablet (500 mg total) by mouth 2 (two) times daily with meals.  Dispense: 180 tablet; Refill: 3    3. Vitamin D deficiency  - Vitamin D; Future    4. Left wrist tendonitis  - provided with gentle stretching/strengthening exercises  - encouraged to increase hydration and rest as able  - Tylenol/NSAIDs as needed  - discussed role of PT +/- Ortho  - to call with any questions or concerns  - Physical Therapy Referral - [facility]  - Ortho Referral - In Network    Pt verbalized understanding and agrees with plan.    Orders Placed This Encounter   Procedures    TSH W Reflex To Free T4    CBC,  Platelet; No Differential    Comp Metabolic Panel (14)    Hemoglobin A1C    Lipid Panel    Vitamin D       Meds This Visit:  Requested Prescriptions     Signed Prescriptions Disp Refills    metFORMIN  MG Oral Tablet 24 Hr 180 tablet 3     Sig: Take 1 tablet (500 mg total) by mouth 2 (two) times daily with meals.       Imaging & Referrals:  PHYSICAL THERAPY - INTERNAL  ORTHOPEDIC - INTERNAL         ID#[id number]       [1] No Known Allergies

## 2025-01-09 ENCOUNTER — NURSE TRIAGE (OUTPATIENT)
Dept: FAMILY MEDICINE CLINIC | Facility: CLINIC | Age: 35
End: 2025-01-09

## 2025-01-09 NOTE — TELEPHONE ENCOUNTER
Action Requested: Summary for Provider     []  Critical Lab, Recommendations Needed  [] Need Additional Advice  [x]   FYI    []   Need Orders  [] Need Medications Sent to Pharmacy  []  Other     SUMMARY:   Spoke with patient, Date of Birth verified  She stated she has a little bump on  left wrist bone. She did some research it might be a bone spur, it's  tender to touch, it has a size of raisin, pain rate 8/10 when touch    She denies swelling, no other symptom.  Last noc she took ibuprofen with relief.    Appt made with Marie LANDERS for eval & treat.          Reason for call: wrist pain   Onset: 4 days         Reason for Disposition   Patient wants to be seen    Protocols used: Skin Lump or Localized Swelling-A-OH      Future Appointments   Date Time Provider Department Center   1/14/2025  4:00 PM Marie Lott APRN [facility]  [place]

## 2025-01-14 ENCOUNTER — OFFICE VISIT (OUTPATIENT)
Dept: INTERNAL MEDICINE CLINIC | Facility: CLINIC | Age: 35
End: 2025-01-14
Payer: COMMERCIAL

## 2025-01-14 VITALS
OXYGEN SATURATION: 96 % | HEART RATE: 93 BPM | BODY MASS INDEX: 34.07 KG/M2 | WEIGHT: 212 LBS | SYSTOLIC BLOOD PRESSURE: 109 MMHG | DIASTOLIC BLOOD PRESSURE: 75 MMHG | HEIGHT: 66 IN

## 2025-01-14 DIAGNOSIS — M77.9 BONE SPUR: Primary | ICD-10-CM

## 2025-01-14 DIAGNOSIS — M54.2 CERVICALGIA: ICD-10-CM

## 2025-01-14 PROCEDURE — 3074F SYST BP LT 130 MM HG: CPT | Performed by: NURSE PRACTITIONER

## 2025-01-14 PROCEDURE — 3008F BODY MASS INDEX DOCD: CPT | Performed by: NURSE PRACTITIONER

## 2025-01-14 PROCEDURE — 99213 OFFICE O/P EST LOW 20 MIN: CPT | Performed by: NURSE PRACTITIONER

## 2025-01-14 PROCEDURE — 3078F DIAST BP <80 MM HG: CPT | Performed by: NURSE PRACTITIONER

## 2025-01-14 RX ORDER — METHOCARBAMOL 750 MG/1
750 TABLET, FILM COATED ORAL 3 TIMES DAILY PRN
Qty: 8 TABLET | Refills: 0 | Status: SHIPPED | OUTPATIENT
Start: 2025-01-14

## 2025-01-14 RX ORDER — NAPROXEN 500 MG/1
500 TABLET ORAL 2 TIMES DAILY WITH MEALS
Qty: 14 TABLET | Refills: 0 | Status: SHIPPED | OUTPATIENT
Start: 2025-01-14 | End: 2025-01-21

## 2025-01-14 NOTE — PROGRESS NOTES
Subjective:   Martina Sheikh is a 34 year old female who presents for Bump (Bump on left wrist/) and Shoulder Pain     Currently being treated for L wrist tendonitis - has been wearing L wrist brace for a few weeks to a month. Was rx'd 5d course of steroids, but she took for 2d then got sick and didn't finish the course.  She noted a painful lump to the bony prominence of her distal radius. No redness, warmth or new injury to the area. No decreased ROM. 2d ago it was painful to be lightly touched, but is feeling slightly better since then. Her primary referred her for PT which she still needs to call to schedule.    Has been inconsistently taking ibuprofen and ASA for pain. Helps when she takes it.    Also woke a couple days ago with L neck/shoulder tightness pain. No injury or trauma. 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 States she is sick and tired of being in pain and feeling tired. Is getting consistent sleep most nights.      History/Other:    Chief Complaint Reviewed and Verified  Nursing Notes Reviewed and   Verified  Tobacco Reviewed  Allergies Reviewed  Medications Reviewed    Problem List Reviewed  Medical History Reviewed  Surgical History   Reviewed  OB Status Reviewed  Family History Reviewed         Tobacco:  She has never smoked tobacco.    Current Outpatient Medications   Medication Sig Dispense Refill    naproxen 500 MG Oral Tab Take 1 tablet (500 mg total) by mouth 2 (two) times daily with meals for 7 days. 14 tablet 0    methocarbamol 750 MG Oral Tab Take 1 tablet (750 mg total) by mouth 3 (three) times daily as needed. 8 tablet 0    metFORMIN  MG Oral Tablet 24 Hr Take 1 tablet (500 mg total) by mouth 2 (two) times daily with meals. 180 tablet 3    acidophilus-pectin Oral Cap Take 1 capsule by mouth daily.      APPLE CIDER VINEGAR OR Take by mouth.           Review of Systems:  Review of Systems  10 point review of systems  otherwise negative with the exception of HPI and assessment and plan.    Objective:   /75   Pulse 93   Ht 5' 6\" (1.676 m)   Wt 212 lb (96.2 kg)   LMP  (LMP Unknown)   SpO2 96%   Breastfeeding No   BMI 34.22 kg/m²  Estimated body mass index is 34.22 kg/m² as calculated from the following:    Height as of this encounter: 5' 6\" (1.676 m).    Weight as of this encounter: 212 lb (96.2 kg).      Physical Exam  Vitals reviewed.   Constitutional:       General: She is not in acute distress.  Musculoskeletal:      Left wrist: Tenderness and bony tenderness present. No swelling or deformity.        Arms:       Comments: Pain at area noted in red. Very mild increase in size of distal radius when compared with R side. Very mild TTP over distal radius.   Neurological:      Mental Status: She is alert.         Assessment & Plan:   1. Bone spur (Primary)  -     Naproxen; Take 1 tablet (500 mg total) by mouth 2 (two) times daily with meals for 7 days.  Dispense: 14 tablet; Refill: 0  - Possible bone spur to left distal radius.  Has not consistently taken NSAIDs for her wrist pain/tendonitis.  Recommend naproxen twice daily with meals for the next 7 days.  Apply ice to the area 20 minutes at a time several times daily for comfort.   - Schedule previously ordered physical therapy for tendonitis.   2. Cervicalgia  -     Methocarbamol; Take 1 tablet (750 mg total) by mouth 3 (three) times daily as needed.  Dispense: 8 tablet; Refill: 0  - Naproxen should also help with the above musculoskeletal pain.  - May take methocarbamol as needed-recommend taking in the evening before bed and applying warm compress/massaging the area.        Return if symptoms worsen or fail to improve.    LEESA Oh, 1/14/2025, 3:13 PM     This note was prepared using Dragon Medical voice recognition dictation software. As a result errors may occur. When identified, these errors have been corrected. While every attempt is made to correct  errors during dictation discrepancies may still exist.

## 2025-01-14 NOTE — PATIENT INSTRUCTIONS
Take naproxen twice daily with food for the next week.  Apply ice to your left wrist 20 minutes at a time several times daily for comfort.  Take methocarbamol as needed in the evening for neck tightness.  Apply warm compress to left side of neck, do light stretching.    Schedule physical therapy for your wrist as ordered by her primary.    If the lump on your wrist gets larger, more painful, red, warm you should be evaluated again.

## 2025-02-24 ENCOUNTER — TELEPHONE (OUTPATIENT)
Dept: PHYSICAL THERAPY | Facility: HOSPITAL | Age: 35
End: 2025-02-24

## 2025-03-11 ENCOUNTER — APPOINTMENT (OUTPATIENT)
Dept: OCCUPATIONAL MEDICINE | Facility: HOSPITAL | Age: 35
End: 2025-03-11
Attending: STUDENT IN AN ORGANIZED HEALTH CARE EDUCATION/TRAINING PROGRAM
Payer: COMMERCIAL

## 2025-03-18 ENCOUNTER — APPOINTMENT (OUTPATIENT)
Dept: OCCUPATIONAL MEDICINE | Facility: HOSPITAL | Age: 35
End: 2025-03-18
Attending: STUDENT IN AN ORGANIZED HEALTH CARE EDUCATION/TRAINING PROGRAM
Payer: COMMERCIAL

## 2025-03-20 ENCOUNTER — APPOINTMENT (OUTPATIENT)
Dept: OCCUPATIONAL MEDICINE | Facility: HOSPITAL | Age: 35
End: 2025-03-20
Attending: STUDENT IN AN ORGANIZED HEALTH CARE EDUCATION/TRAINING PROGRAM
Payer: COMMERCIAL

## 2025-03-25 ENCOUNTER — APPOINTMENT (OUTPATIENT)
Dept: OCCUPATIONAL MEDICINE | Facility: HOSPITAL | Age: 35
End: 2025-03-25
Attending: STUDENT IN AN ORGANIZED HEALTH CARE EDUCATION/TRAINING PROGRAM
Payer: COMMERCIAL

## 2025-03-27 ENCOUNTER — APPOINTMENT (OUTPATIENT)
Dept: OCCUPATIONAL MEDICINE | Facility: HOSPITAL | Age: 35
End: 2025-03-27
Attending: STUDENT IN AN ORGANIZED HEALTH CARE EDUCATION/TRAINING PROGRAM
Payer: COMMERCIAL

## 2025-03-31 ENCOUNTER — TELEPHONE (OUTPATIENT)
Dept: PHYSICAL THERAPY | Facility: HOSPITAL | Age: 35
End: 2025-03-31

## 2025-04-01 ENCOUNTER — OFFICE VISIT (OUTPATIENT)
Dept: INTERNAL MEDICINE CLINIC | Facility: CLINIC | Age: 35
End: 2025-04-01
Payer: COMMERCIAL

## 2025-04-01 VITALS
HEIGHT: 66 IN | OXYGEN SATURATION: 97 % | DIASTOLIC BLOOD PRESSURE: 74 MMHG | TEMPERATURE: 98 F | HEART RATE: 64 BPM | WEIGHT: 211 LBS | SYSTOLIC BLOOD PRESSURE: 102 MMHG | BODY MASS INDEX: 33.91 KG/M2

## 2025-04-01 DIAGNOSIS — R22.32 LUMP OF LEFT WRIST: Primary | ICD-10-CM

## 2025-04-01 PROCEDURE — 99213 OFFICE O/P EST LOW 20 MIN: CPT | Performed by: NURSE PRACTITIONER

## 2025-04-01 PROCEDURE — 3008F BODY MASS INDEX DOCD: CPT | Performed by: NURSE PRACTITIONER

## 2025-04-01 PROCEDURE — 3078F DIAST BP <80 MM HG: CPT | Performed by: NURSE PRACTITIONER

## 2025-04-01 PROCEDURE — 3074F SYST BP LT 130 MM HG: CPT | Performed by: NURSE PRACTITIONER

## 2025-04-02 ENCOUNTER — LAB ENCOUNTER (OUTPATIENT)
Dept: LAB | Facility: HOSPITAL | Age: 35
End: 2025-04-02
Attending: Nurse Practitioner
Payer: COMMERCIAL

## 2025-04-02 ENCOUNTER — HOSPITAL ENCOUNTER (OUTPATIENT)
Dept: GENERAL RADIOLOGY | Facility: HOSPITAL | Age: 35
Discharge: HOME | End: 2025-04-02
Attending: Nurse Practitioner
Payer: COMMERCIAL

## 2025-04-02 ENCOUNTER — OFFICE VISIT (OUTPATIENT)
Dept: PHYSICAL THERAPY | Facility: HOSPITAL | Age: 35
End: 2025-04-02
Attending: STUDENT IN AN ORGANIZED HEALTH CARE EDUCATION/TRAINING PROGRAM
Payer: COMMERCIAL

## 2025-04-02 DIAGNOSIS — M77.8 LEFT WRIST TENDONITIS: Primary | ICD-10-CM

## 2025-04-02 DIAGNOSIS — R22.32 LUMP OF LEFT WRIST: ICD-10-CM

## 2025-04-02 DIAGNOSIS — E55.9 VITAMIN D DEFICIENCY: ICD-10-CM

## 2025-04-02 DIAGNOSIS — Z00.00 WELL ADULT EXAM: ICD-10-CM

## 2025-04-02 LAB
ALBUMIN SERPL-MCNC: 4.9 G/DL (ref 3.2–4.8)
ALBUMIN/GLOB SERPL: 2.1 {RATIO} (ref 1–2)
ALP LIVER SERPL-CCNC: 59 U/L
ALT SERPL-CCNC: 8 U/L
ANION GAP SERPL CALC-SCNC: 7 MMOL/L (ref 0–18)
AST SERPL-CCNC: 14 U/L (ref ?–34)
BILIRUB SERPL-MCNC: 0.4 MG/DL (ref 0.3–1.2)
BUN BLD-MCNC: 14 MG/DL (ref 9–23)
BUN/CREAT SERPL: 15.7 (ref 10–20)
CALCIUM BLD-MCNC: 9.6 MG/DL (ref 8.7–10.4)
CHLORIDE SERPL-SCNC: 105 MMOL/L (ref 98–112)
CHOLEST SERPL-MCNC: 209 MG/DL (ref ?–200)
CO2 SERPL-SCNC: 29 MMOL/L (ref 21–32)
CREAT BLD-MCNC: 0.89 MG/DL
DEPRECATED RDW RBC AUTO: 36.7 FL (ref 35.1–46.3)
EGFRCR SERPLBLD CKD-EPI 2021: 86 ML/MIN/1.73M2 (ref 60–?)
ERYTHROCYTE [DISTWIDTH] IN BLOOD BY AUTOMATED COUNT: 11.9 % (ref 11–15)
EST. AVERAGE GLUCOSE BLD GHB EST-MCNC: 114 MG/DL (ref 68–126)
FASTING PATIENT LIPID ANSWER: YES
FASTING STATUS PATIENT QL REPORTED: YES
GLOBULIN PLAS-MCNC: 2.3 G/DL (ref 2–3.5)
GLUCOSE BLD-MCNC: 84 MG/DL (ref 70–99)
HBA1C MFR BLD: 5.6 % (ref ?–5.7)
HCT VFR BLD AUTO: 43.4 %
HDLC SERPL-MCNC: 61 MG/DL (ref 40–59)
HGB BLD-MCNC: 14.4 G/DL
LDLC SERPL CALC-MCNC: 134 MG/DL (ref ?–100)
MCH RBC QN AUTO: 28.1 PG (ref 26–34)
MCHC RBC AUTO-ENTMCNC: 33.2 G/DL (ref 31–37)
MCV RBC AUTO: 84.8 FL
NONHDLC SERPL-MCNC: 148 MG/DL (ref ?–130)
OSMOLALITY SERPL CALC.SUM OF ELEC: 292 MOSM/KG (ref 275–295)
PLATELET # BLD AUTO: 288 10(3)UL (ref 150–450)
POTASSIUM SERPL-SCNC: 4 MMOL/L (ref 3.5–5.1)
PROT SERPL-MCNC: 7.2 G/DL (ref 5.7–8.2)
RBC # BLD AUTO: 5.12 X10(6)UL
SODIUM SERPL-SCNC: 141 MMOL/L (ref 136–145)
TRIGL SERPL-MCNC: 81 MG/DL (ref 30–149)
TSI SER-ACNC: 0.69 UIU/ML (ref 0.55–4.78)
VIT D+METAB SERPL-MCNC: 17 NG/ML (ref 30–100)
VLDLC SERPL CALC-MCNC: 15 MG/DL (ref 0–30)
WBC # BLD AUTO: 6.7 X10(3) UL (ref 4–11)

## 2025-04-02 PROCEDURE — 82306 VITAMIN D 25 HYDROXY: CPT

## 2025-04-02 PROCEDURE — 97162 PT EVAL MOD COMPLEX 30 MIN: CPT | Performed by: PHYSICAL THERAPIST

## 2025-04-02 PROCEDURE — 36415 COLL VENOUS BLD VENIPUNCTURE: CPT

## 2025-04-02 PROCEDURE — 80053 COMPREHEN METABOLIC PANEL: CPT

## 2025-04-02 PROCEDURE — 80061 LIPID PANEL: CPT

## 2025-04-02 PROCEDURE — 85027 COMPLETE CBC AUTOMATED: CPT

## 2025-04-02 PROCEDURE — 83036 HEMOGLOBIN GLYCOSYLATED A1C: CPT

## 2025-04-02 PROCEDURE — 97140 MANUAL THERAPY 1/> REGIONS: CPT | Performed by: PHYSICAL THERAPIST

## 2025-04-02 PROCEDURE — 73110 X-RAY EXAM OF WRIST: CPT | Performed by: NURSE PRACTITIONER

## 2025-04-02 PROCEDURE — 84443 ASSAY THYROID STIM HORMONE: CPT

## 2025-04-02 PROCEDURE — 97110 THERAPEUTIC EXERCISES: CPT | Performed by: PHYSICAL THERAPIST

## 2025-04-03 NOTE — PROGRESS NOTES
UPPER EXTREMITY EVALUATION:     Diagnosis:   Left wrist tendonitis (M77.8) Patient:  Martina Sheikh (34 year old, female)        Referring Provider: Krissy Zhu  Today's Date   4/3/2025    Precautions:  None   Date of Evaluation: 04/02/25  Next MD visit: NA  Date of Surgery: NA     PATIENT SUMMARY   Summary of chief complaints: L wrist and thumb pain  History of current condition: Patient reports having L wrist pain that first occurred in December 2024 and L thumb pain in January 2025. She thinks it could be related to her having a baby 9 months ago. She was wearing a brace that did helped with her pain, but had N/T so she discontinued it. She also developed a protruding mass in the L wrist in which she will be having imaging performed soon. She tried using a band, but that did not help. Due to pain, she has difficulty carrying, pushing, and lifting. She is currently taking Advil and Tynelol medication for pain relief.   Pain level: current 0 /10, at best 0 /10, at worst 5 /10  Occupation: retail   Leisure activities/Hobbies: taking care of her child   Prior level of function: independent  Current limitations: carrying, pushing, and lifting    Hand Dominance: right   Past medical history was reviewed with Martina.    Imaging/Tests: None performed at this time   Martina  has a past medical history of HPV in female, Obesity, and PCOS (polycystic ovarian syndrome).  She  has a past surgical history that includes hc cervical cerclage (02/01/2024).    ASSESSMENT  Martina presents to physical therapy evaluation with primary c/o L wrist and thumb pain. The results of the objective tests and measures show decreased wrist ROM, decreased wrist and thumb strength, impaired posture, and increased pain. Functional deficits include but are not limited to carrying, pushing, and lifting. Signs and symptoms are consistent with diagnosis of Left wrist tendonitis (M77.8). Pt and PT discussed evaluation findings,  pathology, POC and HEP.  Pt voiced understanding and performs HEP correctly without reported pain. Skilled Physical Therapy is medically necessary to address the above impairments and reach functional goals.  OBJECTIVE:    Musculoskeletal  Observation/Posture: forward head posture  Palpation: TTP in the L extensor carpi radialis longus & extensor pollicis tendon   Accessory motion: WNL    Special Tests: Finkelstein: Negative      ROM and Strength (* denotes performed with pain)     Cervical ROM MMT (-/5)     Flex WFL       Ext WFL      R L R L     Side bend WFL WFL         Rotation WFL WFL       ,   Shoulder   ROM MMT (-/5)    R L R L     Flex WFL WFL 5 5     Ext             Abd (C5) WFL WFL 5 5     IR WFL WFL 5 5     ER WFL WFL 5 5     Low Trap n/a         Mid Trap n/a         SA n/a         ,   Elbow   ROM MMT (-/5)    R L R L     Flex (C6) WFL WFL 5 5     Ext (C7) WFL WFL 5 5     Pronation             Supination           ,   Wrist   ROM MMT (-/5)    R L R L     Flex (C7) 35 35 5 5     Ext (C6) 85 80 5 4     Ulnar Dev 40 40 5 5     Radial Dev 20 20 5 4*     Thumb Ext (C8)             Digit Flex (C8) n/a         Interossei (T1) n/a          (lbs) n/a         Pinch (lbs) n/a         ,       Flexibility:  UE Flexibility R L     Upper Trap mod restricted min restricted     Levator Scap         Pec Major         Scalenes         Latissimus         Bicep           Neurological:  Sensation: bilat UE light touch is intact      Today's Treatment and Response:   Pt education was provided on exam findings, treatment diagnosis, treatment plan, expectations, and prognosis.  Today's Treatment       4/2/2025   UE Treatment   Therapeutic Exercise 1. Wrist extensors stretch 3x30s ea bilat.   2. Wrist extensors stretch with RD bias 3x30s ea bilat.   3. Wrist extension eccentric 3x10 1# L   4. Radial deviation eccentric 3x10 1# L    Manual Therapy STM in the wrist extensors and thumb extensors tendon    Therapeutic Exercise  Minutes 15   Manual Therapy Minutes 15   Evaluation Minutes 15   Total Time Of Timed Procedures 30   Total Time Of Service-Based Procedures 15   Total Treatment Time 45   HEP 1. Wrist extensors stretch 3x30s ea bilat.   2. Wrist extensors stretch with RD bias 3x30s ea bilat.   3. Wrist extension eccentric 3x10 1# L   4. Radial deviation eccentric 3x10 1# L         Patient was instructed in and issued a HEP for: 1. Wrist extensors stretch 3x30s ea bilat.   2. Wrist extensors stretch with RD bias 3x30s ea bilat.   3. Wrist extension eccentric 3x10 1# L   4. Radial deviation eccentric 3x10 1# L     Charges:  PT EVAL: Moderate Complexity, 1 TE, 1 MT  In agreement with evaluation findings and clinical rationale, this evaluation involved MODERATE COMPLEXITY decision making due to 1-2 personal factors/comorbidities, 3 or more body structures involved/activity limitations, and evolving symptoms as documented in the evaluation.                                                          PLAN OF CARE:    Goals: (to be met in 12 visits)    Not Met Progress Toward Partially Met Met   Pt will improve wrist strength throughout to 5/5 to improve function with lifting.  [] [] [] []   Pt will report <4/10 pain while carrying her baby.   [] [] [] []   Pt will be independent and compliant with comprehensive HEP to maintain progress achieved in PT. [] [] [] []         Frequency / Duration: Patient will be seen 2x/week or a total of 12  visits over a 90 day period. Treatment will include: Manual Therapy; Neuromuscular Re-education; Therapeutic Activities; Therapeutic Exercise; Home Exercise Program instruction    Education or treatment limitation: None   Rehab Potential: good     QuickDASH Outcome Score  Score: (Patient-Rptd) 25 % (4/2/2025  8:28 AM)      Patient/Family/Caregiver was advised of these findings, precautions, and treatment options and has agreed to actively participate in planning and for this course of care.    Thank you for  your referral. Please co-sign or sign and return this letter via fax as soon as possible to [phone]. If you have any questions, please contact me at Dept: [phone]    Sincerely,  Electronically signed by therapist: Dimitris Santa PT  Physician's certification required: Yes  I certify the need for these services furnished under this plan of treatment and while under my care.    X___________________________________________________ Date____________________    Certification From: 4/3/2025  To:7/2/2025

## 2025-04-07 DIAGNOSIS — R22.32 LUMP OF LEFT WRIST: Primary | ICD-10-CM

## 2025-04-09 ENCOUNTER — OFFICE VISIT (OUTPATIENT)
Dept: PHYSICAL THERAPY | Facility: HOSPITAL | Age: 35
End: 2025-04-09
Attending: STUDENT IN AN ORGANIZED HEALTH CARE EDUCATION/TRAINING PROGRAM
Payer: COMMERCIAL

## 2025-04-09 PROCEDURE — 97140 MANUAL THERAPY 1/> REGIONS: CPT | Performed by: PHYSICAL THERAPIST

## 2025-04-09 PROCEDURE — 97110 THERAPEUTIC EXERCISES: CPT | Performed by: PHYSICAL THERAPIST

## 2025-04-10 NOTE — PROGRESS NOTES
Patient: Martina Sheikh (34 year old, female) Referring Provider:  Insurance:   Diagnosis: Left wrist tendonitis (M77.8) Krissy Zhu  [place] OUT OF STATE   Date of Surgery: NA Next MD visit:  N/A   Precautions:  None NA Referral Information:    Date of Evaluation: Req: 0, Auth: 0, Exp:     04/02/25 POC Auth Visits:  12       Today's Date   4/9/2025    Subjective  Patient reports being compliant with HEP.       Pain: 3/10     Objective  Palpation: TTP in the L extensor carpi radialis longus & extensor pollicis tendon      Assessment  New exercises were added today to improve patient's wrist strength so that she can be able to lift her baby. She tolerated well with all exercises without increase in pain.    Goals (to be met in 12 visits)      Not Met Progress Toward Partially Met Met   Pt will improve wrist strength throughout to 5/5 to improve function with lifting.  [] [] [] []   Pt will report <4/10 pain while carrying her baby.   [] [] [] []   Pt will be independent and compliant with comprehensive HEP to maintain progress achieved in PT. [] [] [] []             Plan  Progress as needed.    Treatment Last 4 Visits  Treatment Day: 2       4/2/2025 4/9/2025   UE Treatment   Therapeutic Exercise 1. Wrist extensors stretch 3x30s ea bilat.   2. Wrist extensors stretch with RD bias 3x30s ea bilat.   3. Wrist extension eccentric 3x10 1# L   4. Radial deviation eccentric 3x10 1# L  1. Wrist extensors stretch 3x30s L   2. Wrist extensors stretch with RD bias 3x30s L  3. Wrist extension eccentric 3x10 1# L   4. Radial deviation eccentric 3x10 1# L   5. Wrist flexion eccentric 3x10 1# L  6. Flex-bar 4 ways 3x10 ea YTB   7. Wrist flexors stretch 3x30s ea L     Manual Therapy STM in the wrist extensors and thumb extensors tendon  STM in the wrist extensors and thumb extensors tendon    Therapeutic Exercise Minutes 15 20   Manual Therapy Minutes 15 25   Evaluation Minutes 15    Total Time Of Timed Procedures 30 45   Total  Time Of Service-Based Procedures 15 0   Total Treatment Time 45 45   HEP 1. Wrist extensors stretch 3x30s ea bilat.   2. Wrist extensors stretch with RD bias 3x30s ea bilat.   3. Wrist extension eccentric 3x10 1# L   4. Radial deviation eccentric 3x10 1# L          HEP  1. Wrist extensors stretch 3x30s ea bilat.   2. Wrist extensors stretch with RD bias 3x30s ea bilat.   3. Wrist extension eccentric 3x10 1# L   4. Radial deviation eccentric 3x10 1# L     Charges  1 TE, 2 MT

## 2025-04-11 ENCOUNTER — OFFICE VISIT (OUTPATIENT)
Dept: PHYSICAL THERAPY | Facility: HOSPITAL | Age: 35
End: 2025-04-11
Attending: STUDENT IN AN ORGANIZED HEALTH CARE EDUCATION/TRAINING PROGRAM
Payer: COMMERCIAL

## 2025-04-11 PROCEDURE — 97140 MANUAL THERAPY 1/> REGIONS: CPT | Performed by: PHYSICAL THERAPIST

## 2025-04-11 PROCEDURE — 97110 THERAPEUTIC EXERCISES: CPT | Performed by: PHYSICAL THERAPIST

## 2025-04-11 NOTE — PROGRESS NOTES
Patient: Martina Sheikh (34 year old, female) Referring Provider:  Insurance:   Diagnosis: Left wrist tendonitis (M77.8) Krissy GALAN OUT OF STATE   Date of Surgery: NA Next MD visit:  N/A   Precautions:  None NA Referral Information:    Date of Evaluation: Req: 0, Auth: 0, Exp:     04/02/25 POC Auth Visits:  12       Today's Date   4/11/2025    Subjective  Patient reports pain is about the same.       Pain: 3/10     Objective  Palpation: TTP in the L extensor carpi radialis longus & extensor pollicis tendon      Assessment  New exercises were added today to improve patient's strength so that she can be able to lift her baby. She tolerated well with all exercises without increase in pain.    Goals (to be met in 12 visits)      Not Met Progress Toward Partially Met Met   Pt will improve wrist strength throughout to 5/5 to improve function with lifting.  [] [] [] []   Pt will report <4/10 pain while carrying her baby.   [] [] [] []   Pt will be independent and compliant with comprehensive HEP to maintain progress achieved in PT. [] [] [] []                 Plan  Progress as needed.    Treatment Last 4 Visits  Treatment Day: 3       4/2/2025 4/9/2025 4/11/2025   UE Treatment   Therapeutic Exercise 1. Wrist extensors stretch 3x30s ea bilat.   2. Wrist extensors stretch with RD bias 3x30s ea bilat.   3. Wrist extension eccentric 3x10 1# L   4. Radial deviation eccentric 3x10 1# L  1. Wrist extensors stretch 3x30s L   2. Wrist extensors stretch with RD bias 3x30s L  3. Wrist extension eccentric 3x10 1# L   4. Radial deviation eccentric 3x10 1# L   5. Wrist flexion eccentric 3x10 1# L  6. Flex-bar 4 ways 3x10 ea YTB   7. Wrist flexors stretch 3x30s ea L   1. Standing shoulder ABCs with YTB 2 sets   2. Wrist extensors stretch with RD bias 3x30s L   3. Wrist extension eccentric 3x10 1# L   4. Radial deviation eccentric 3x10 1# L   5. Wrist flexion eccentric 3x10 1# L   6. Flex-bar 4 ways 3x10 ea YTB   7. Wrist  flexors stretch 3x30s ea L   8. [place] 3x10 1#  9. Rows/Shoulder extension 3x10 ea YTB    Manual Therapy STM in the wrist extensors and thumb extensors tendon  STM in the wrist extensors and thumb extensors tendon  STM in the wrist extensors and thumb extensors tendon   Therapeutic Exercise Minutes 15 20 20   Manual Therapy Minutes 15 25 25   Evaluation Minutes 15     Total Time Of Timed Procedures 30 45 45   Total Time Of Service-Based Procedures 15 0 0   Total Treatment Time 45 45 45   HEP 1. Wrist extensors stretch 3x30s ea bilat.   2. Wrist extensors stretch with RD bias 3x30s ea bilat.   3. Wrist extension eccentric 3x10 1# L   4. Radial deviation eccentric 3x10 1# L           HEP  1. Wrist extensors stretch 3x30s ea bilat.   2. Wrist extensors stretch with RD bias 3x30s ea bilat.   3. Wrist extension eccentric 3x10 1# L   4. Radial deviation eccentric 3x10 1# L     Charges  1 TE, 2 MT

## 2025-04-15 ENCOUNTER — APPOINTMENT (OUTPATIENT)
Dept: OCCUPATIONAL MEDICINE | Facility: HOSPITAL | Age: 35
End: 2025-04-15
Attending: STUDENT IN AN ORGANIZED HEALTH CARE EDUCATION/TRAINING PROGRAM
Payer: COMMERCIAL

## 2025-04-17 ENCOUNTER — OFFICE VISIT (OUTPATIENT)
Dept: PHYSICAL THERAPY | Facility: HOSPITAL | Age: 35
End: 2025-04-17
Attending: STUDENT IN AN ORGANIZED HEALTH CARE EDUCATION/TRAINING PROGRAM
Payer: COMMERCIAL

## 2025-04-17 PROCEDURE — 97140 MANUAL THERAPY 1/> REGIONS: CPT | Performed by: PHYSICAL THERAPIST

## 2025-04-17 PROCEDURE — 97110 THERAPEUTIC EXERCISES: CPT | Performed by: PHYSICAL THERAPIST

## 2025-04-18 NOTE — PROGRESS NOTES
Patient: Martina Sheikh (34 year old, female) Referring Provider:  Insurance:   Diagnosis: Left wrist tendonitis (M77.8) Krissy Zhu  BCBS OUT OF STATE   Date of Surgery: NA Next MD visit:  N/A   Precautions:  None NA Referral Information:    Date of Evaluation: Req: 0, Auth: 0, Exp:     04/02/25 POC Auth Visits:  12       Today's Date   4/17/2025    Subjective  Patient reports feeling fine after last PT session, but had increased pain this past Tuesday. She states that she was doing more than usual and slept wrong yesterday.       Pain: 6/10     Objective  Palpation: TTP in the L extensor carpi radialis longus & extensor pollicis tendon      Assessment  Distraction was performed today to improve her pain. Also, taping was performed to assist with pain relief. She tolerated well with all exercises without any significant increase in pain.    Goals (to be met in 12 visits)      Not Met Progress Toward Partially Met Met   Pt will improve wrist strength throughout to 5/5 to improve function with lifting.  [] [] [] []   Pt will report <4/10 pain while carrying her baby.   [] [] [] []   Pt will be independent and compliant with comprehensive HEP to maintain progress achieved in PT. [] [] [] []                     Plan  Progress as needed.    Treatment Last 4 Visits  Treatment Day: 4       4/2/2025 4/9/2025 4/11/2025 4/17/2025   UE Treatment   Therapeutic Exercise 1. Wrist extensors stretch 3x30s ea bilat.   2. Wrist extensors stretch with RD bias 3x30s ea bilat.   3. Wrist extension eccentric 3x10 1# L   4. Radial deviation eccentric 3x10 1# L  1. Wrist extensors stretch 3x30s L   2. Wrist extensors stretch with RD bias 3x30s L  3. Wrist extension eccentric 3x10 1# L   4. Radial deviation eccentric 3x10 1# L   5. Wrist flexion eccentric 3x10 1# L  6. Flex-bar 4 ways 3x10 ea YTB   7. Wrist flexors stretch 3x30s ea L   1. Standing shoulder ABCs with YTB 2 sets   2. Wrist extensors stretch with RD bias 3x30s L   3.  Wrist extension eccentric 3x10 1# L   4. Radial deviation eccentric 3x10 1# L   5. Wrist flexion eccentric 3x10 1# L   6. Flex-bar 4 ways 3x10 ea YTB   7. Wrist flexors stretch 3x30s ea L   8. Scaption 3x10 1#  9. Rows/Shoulder extension 3x10 ea YTB  1. Wrist extensors stretch with RD bias 3x30s L  2. Wrist extension eccentric 3x10 2# L  3. Radial deviation eccentric 3x10 2# L  4. Wrist flexion eccentric 3x10 2# L  5. Flex-bar 4 ways 3x10 ea YTB  6. Wrist flexors stretch 3x30s ea L  7. Scaption 3x10 1#  8. Rows/Shoulder extension 3x10 ea YTB  Wrist taping was performed in her wrist extensors    Manual Therapy STM in the wrist extensors and thumb extensors tendon  STM in the wrist extensors and thumb extensors tendon  STM in the wrist extensors and thumb extensors tendon STM in the wrist extensors and thumb extensors tendon  Thumb & wrist distractions    Therapeutic Exercise Minutes 15 20 20 20   Manual Therapy Minutes 15 25 25 25   Evaluation Minutes 15      Total Time Of Timed Procedures 30 45 45 45   Total Time Of Service-Based Procedures 15 0 0 0   Total Treatment Time 45 45 45 45   HEP 1. Wrist extensors stretch 3x30s ea bilat.   2. Wrist extensors stretch with RD bias 3x30s ea bilat.   3. Wrist extension eccentric 3x10 1# L   4. Radial deviation eccentric 3x10 1# L            HEP  1. Wrist extensors stretch 3x30s ea bilat.   2. Wrist extensors stretch with RD bias 3x30s ea bilat.   3. Wrist extension eccentric 3x10 1# L   4. Radial deviation eccentric 3x10 1# L     Charges  1 TE, 2 MT

## 2025-04-22 ENCOUNTER — OFFICE VISIT (OUTPATIENT)
Dept: PHYSICAL THERAPY | Facility: HOSPITAL | Age: 35
End: 2025-04-22
Attending: STUDENT IN AN ORGANIZED HEALTH CARE EDUCATION/TRAINING PROGRAM
Payer: COMMERCIAL

## 2025-04-22 ENCOUNTER — TELEPHONE (OUTPATIENT)
Facility: CLINIC | Age: 35
End: 2025-04-22

## 2025-04-22 PROCEDURE — 97112 NEUROMUSCULAR REEDUCATION: CPT | Performed by: PHYSICAL THERAPIST

## 2025-04-22 PROCEDURE — 97110 THERAPEUTIC EXERCISES: CPT | Performed by: PHYSICAL THERAPIST

## 2025-04-22 PROCEDURE — 97140 MANUAL THERAPY 1/> REGIONS: CPT | Performed by: PHYSICAL THERAPIST

## 2025-04-22 NOTE — TELEPHONE ENCOUNTER
Patient scheduled for left wrist cyst.    Imaging in [facility].    Future Appointments   Date Time Provider Department Center   4/22/2025  6:30 PM Dimitris Santa PT [facility] PT [facility]   4/24/2025  6:45 PM Dimitris Santa PT [facility] PT EM [facility]   4/29/2025  6:45 PM Dimitris Santa PT [facility] PT [facility]   5/1/2025 10:40 AM Jose Daniel Heller MD [facility] ORTH [facility] 10 [facility]   6/4/2025  8:45 AM Krissy Zhu MD [facility]     Please advise.

## 2025-04-23 NOTE — PROGRESS NOTES
Patient: Martina Sheikh (34 year old, female) Referring Provider:  Insurance:   Diagnosis: Left wrist tendonitis (M77.8) Kriss[place]ia Zhu  [employer] OUT OF STATE   Date of Surgery: NA Next MD visit:  N/A   Precautions:  None NA Referral Information:    Date of Evaluation: Req: 0, Auth: 0, Exp:     04/02/25 POC Auth Visits:  12       Today's Date   4/22/2025    Subjective  Patient reports pain was better than last PT session, but continues to feel the same.       Pain: 4/10     Objective  Palpation: TTP in the L extensor carpi radialis longus & extensor pollicis tendon      Assessment  Today's session focused on strengtening of her UE to take stress off her L wrist. She required occasional verbal and visual cues to keep the wrist in neutral to prevent worsening of pain.    Goals (to be met in 12 visits)      Not Met Progress Toward Partially Met Met   Pt will improve wrist strength throughout to 5/5 to improve function with lifting.  [] [] [] []   Pt will report <4/10 pain while carrying her baby.   [] [] [] []   Pt will be independent and compliant with comprehensive HEP to maintain progress achieved in PT. [] [] [] []                         Plan  Progress as needed.    Treatment Last 4 Visits  Treatment Day: 5       4/9/2025 4/11/2025 4/17/2025 4/22/2025   UE Treatment   Therapeutic Exercise 1. Wrist extensors stretch 3x30s L   2. Wrist extensors stretch with RD bias 3x30s L  3. Wrist extension eccentric 3x10 1# L   4. Radial deviation eccentric 3x10 1# L   5. Wrist flexion eccentric 3x10 1# L  6. Flex-bar 4 ways 3x10 ea YTB   7. Wrist flexors stretch 3x30s ea L   1. Standing shoulder ABCs with YTB 2 sets   2. Wrist extensors stretch with RD bias 3x30s L   3. Wrist extension eccentric 3x10 1# L   4. Radial deviation eccentric 3x10 1# L   5. Wrist flexion eccentric 3x10 1# L   6. Flex-bar 4 ways 3x10 ea YTB   7. Wrist flexors stretch 3x30s ea L   8. Scaption 3x10 1#  9. Rows/Shoulder extension 3x10 ea YTB  1. Wrist  extensors stretch with RD bias 3x30s L  2. Wrist extension eccentric 3x10 2# L  3. Radial deviation eccentric 3x10 2# L  4. Wrist flexion eccentric 3x10 2# L  5. Flex-bar 4 ways 3x10 ea YTB  6. Wrist flexors stretch 3x30s ea L  7. Scaption 3x10 1#  8. Rows/Shoulder extension 3x10 ea YTB  Wrist taping was performed in her wrist extensors  Wrist taping was performed in her wrist extensors   1. Scaption 3x10 1#   2. Rows/Shoulder extension 3x10 ea RTB   3. Horizontal ABD 3x10 RTB   4. Shoulder ER 3x10 RTB   5. Elbow flexion 3x10 3#  6. Elbow extension pull down 3x10 RTB   7. SA wall slides 3x10 YTB      Neuro Re-Education    1. Flex-bar 4 ways 3x10 ea Yellow   2. Flex-bar RD 3x10 Yellow  3. Digi-flex Red all fingers/individuals 2 min. Ea    Manual Therapy STM in the wrist extensors and thumb extensors tendon  STM in the wrist extensors and thumb extensors tendon STM in the wrist extensors and thumb extensors tendon  Thumb & wrist distractions     Therapeutic Exercise Minutes 20 20 20 20   Neuro Re-Educ Minutes    10   Manual Therapy Minutes 25 25 25 10   Total Time Of Timed Procedures 45 45 45 40   Total Time Of Service-Based Procedures 0 0 0 0   Total Treatment Time 45 45 45 40        HEP  1. Wrist extensors stretch 3x30s ea bilat.   2. Wrist extensors stretch with RD bias 3x30s ea bilat.   3. Wrist extension eccentric 3x10 1# L   4. Radial deviation eccentric 3x10 1# L     Charges  1 TE, 1 NR, 1 MT

## 2025-04-24 ENCOUNTER — APPOINTMENT (OUTPATIENT)
Dept: PHYSICAL THERAPY | Facility: HOSPITAL | Age: 35
End: 2025-04-24
Attending: STUDENT IN AN ORGANIZED HEALTH CARE EDUCATION/TRAINING PROGRAM
Payer: COMMERCIAL

## 2025-04-29 ENCOUNTER — OFFICE VISIT (OUTPATIENT)
Dept: PHYSICAL THERAPY | Facility: HOSPITAL | Age: 35
End: 2025-04-29
Attending: STUDENT IN AN ORGANIZED HEALTH CARE EDUCATION/TRAINING PROGRAM
Payer: COMMERCIAL

## 2025-04-29 PROCEDURE — 97140 MANUAL THERAPY 1/> REGIONS: CPT | Performed by: PHYSICAL THERAPIST

## 2025-04-29 PROCEDURE — 97110 THERAPEUTIC EXERCISES: CPT | Performed by: PHYSICAL THERAPIST

## 2025-04-30 NOTE — PROGRESS NOTES
Progress Summary  Pt has attended 7 visits in Physical Therapy.    Patient: Martina Sheikh (34 year old, female) Referring Provider:  Insurance:   Diagnosis: Left wrist tendonitis (M77.8) Krissy Zhu  [facility] OUT OF STATE   Date of Surgery: NA Next MD visit:  N/A   Precautions:  None NA Referral Information:    Date of Evaluation: Req: 0, Auth: 0, Exp:     04/02/25 POC Auth Visits:  12       Today's Date   4/29/2025    Subjective  Patient reports no pain today. She states that she has been not using her L arm as much and has been resting it.       Pain: 0/10     Objective         Wrist    ROM MMT (-/5)    R L R L     Flex (C7) 35 35 5 4*     Ext (C6) 85 80 5 5     Ulnar Dev 40 40 5 5     Radial Dev 20 20 5 4*     Thumb Ext (C8)         Digit Flex (C8) n/a       Interossei (T1) n/a        (lbs) n/a       Pinch (lbs) n/a           Assessment  Patient was not able to have significant pain relief with PT treatment. She continues to have strength weakness in her L wrist especially with extension, flexion, and radial deviation. She does noticed if she rest and do less of her activites, she has less pain. Wrist taping has also assisted with some pain relief. She will be following up with her MD to discuss her next phase of treatment if PT has not significantly improve her pain. She was educated on taping and strengthening in the meantime so that she can continue to improve on her own. She verbalized understanding.    Goals (to be met in 12 visits)   Goals: (to be met in 12 visits)     Not Met Progress Toward Partially Met Met   Pt will improve wrist strength throughout to 5/5 to improve function with lifting.  []  [x]  []  []    Pt will report <4/10 pain while carrying her baby.   []  [x]  []  []    Pt will be independent and compliant with comprehensive HEP to maintain progress achieved in PT. []  []  []  [x]        Post QuickDASH Outcome Score  No data recorded  25 % improvement    Plan: Continue skilled  Physical Therapy 2 x/week or a total of 8 visits over a 90 day period. Treatment will include: Manual Therapy; Neuromuscular Re-education; Therapeutic Activities; Therapeutic Exercise; Home Exercise Program instruction        Patient/Family/Caregiver was advised of these findings, precautions, and treatment options and has agreed to actively participate in planning and for this course of care.    Thank you for your referral. If you have any questions, please contact me at Dept: [phone].    Sincerely,  Electronically signed by therapist: Dimitris Santa PT     Physician's certification required:  Yes  Please co-sign or sign and return this letter via fax as soon as possible to [phone].   I certify the need for these services furnished under this plan of treatment and while under my care.    X___________________________________________________ Date____________________    Certification From: 4/30/2025  To:7/29/2025      Treatment Last 4 Visits  Treatment Day: 6       4/11/2025 4/17/2025 4/22/2025 4/29/2025   UE Treatment   Therapeutic Exercise 1. Standing shoulder ABCs with YTB 2 sets   2. Wrist extensors stretch with RD bias 3x30s L   3. Wrist extension eccentric 3x10 1# L   4. Radial deviation eccentric 3x10 1# L   5. Wrist flexion eccentric 3x10 1# L   6. Flex-bar 4 ways 3x10 ea YTB   7. Wrist flexors stretch 3x30s ea L   8. Scaption 3x10 1#  9. Rows/Shoulder extension 3x10 ea YTB  1. Wrist extensors stretch with RD bias 3x30s L  2. Wrist extension eccentric 3x10 2# L  3. Radial deviation eccentric 3x10 2# L  4. Wrist flexion eccentric 3x10 2# L  5. Flex-bar 4 ways 3x10 ea YTB  6. Wrist flexors stretch 3x30s ea L  7. Scaption 3x10 1#  8. Rows/Shoulder extension 3x10 ea YTB  Wrist taping was performed in her wrist extensors  Wrist taping was performed in her wrist extensors   1. Scaption 3x10 1#   2. Rows/Shoulder extension 3x10 ea RTB   3. Horizontal ABD 3x10 RTB   4. Shoulder ER 3x10 RTB   5. Elbow flexion  3x10 3#  6. Elbow extension pull down 3x10 RTB   7. SA wall slides 3x10 YTB    Wrist taping was performed in her wrist extensors   1. Scaption 3x10 1#   2. Rows/Shoulder extension 3x10 ea RTB   3. Horizontal ABD 3x10 RTB   4. Shoulder ER 3x10 RTB   5. Elbow flexion 3x10 3#   6. Elbow extension pull down 3x10 RTB   7. Eccentric wrist flexion/extension 2x10 ea 2#   8. RD eccentric 2x10 2#      Neuro Re-Education   1. Flex-bar 4 ways 3x10 ea Yellow   2. Flex-bar RD 3x10 Yellow  3. Digi-flex Red all fingers/individuals 2 min. Ea     Manual Therapy STM in the wrist extensors and thumb extensors tendon STM in the wrist extensors and thumb extensors tendon  Thumb & wrist distractions   STM in the wrist extensors and thumb extensors tendon   Thumb & wrist distractions      Therapeutic Exercise Minutes 20 20 20 20   Neuro Re-Educ Minutes   10    Manual Therapy Minutes 25 25 10 25   Total Time Of Timed Procedures 45 45 40 45   Total Time Of Service-Based Procedures 0 0 0 0   Total Treatment Time 45 45 40 45   HEP    1. Eccentric wrist flexion/extension 2x10 ea 2#   2. Wrist RD eccentric 2x10 2#           HEP  1. Eccentric wrist flexion/extension 2x10 ea 2#   2. Wrist RD eccentric 2x10 2#       Charges  1 TE, 2 MT

## 2025-05-01 ENCOUNTER — OFFICE VISIT (OUTPATIENT)
Age: 35
End: 2025-05-01
Payer: COMMERCIAL

## 2025-05-01 DIAGNOSIS — M65.4 DE QUERVAIN'S DISEASE (RADIAL STYLOID TENOSYNOVITIS): Primary | ICD-10-CM

## 2025-05-01 DIAGNOSIS — M25.532 LEFT WRIST PAIN: ICD-10-CM

## 2025-05-01 PROCEDURE — 99203 OFFICE O/P NEW LOW 30 MIN: CPT | Performed by: STUDENT IN AN ORGANIZED HEALTH CARE EDUCATION/TRAINING PROGRAM

## 2025-05-01 NOTE — PROGRESS NOTES
Clinic Note          The following individual(s) verbally consented to be recorded using ambient AI listening technology and understand that they can each withdraw their consent to this listening technology at any point by asking the clinician to turn off or pause the recording:    Patient name: Martina Sheikh    Allergies[1]    CC: Left wrist swelling    History of Present Illness  Martina Sheikh is a 34 year old female who presents with persistent wrist swelling, left wrist.    Approximately a year ago, she developed DeQuervain's tendonitis in her left wrist postpartum. Initially, she was treated with bracing and therapy, which helped her pain significantly, but around December or January, she noticed a bump forming on her wrist, causing discomfort as the brace rubbed against it.     The swelling is located where she previously had tendonitis pain.    The swelling is not painful.     She is not currently taking any medication for the swelling, as she prefers to avoid pills unless necessary. She uses ice for relief.      Past Medical History[2]  Past Surgical History[3]  Current Medications[4]  Family History[5]  Social History     Occupational History    Occupation: manager   Tobacco Use    Smoking status: Never     Passive exposure: Never    Smokeless tobacco: Never   Vaping Use    Vaping status: Never Used   Substance and Sexual Activity    Alcohol use: Yes    Drug use: Never    Sexual activity: Yes     Partners: Male        Review of Systems:  Negative unless stated in HPI.      Physical Exam:    Physical Exam          Constitutional: NAD. AOx3. Well-developed and Well-nourished.   Psychiatric: Normal mood/ affect/ behavior. Judgment and thought content normal.     Left Upper Extremity:   Inspection: Skin Intact. No skin lesions. No gross deformity. Swelling at the first dorsal compartment.   Palpation: No TTP over first dorsal compartment. No discrete mass is felt in the area of soft tissue swelling. No  tenderness to palpation over 1st CMC joint. No pain elicited with axial loading of the 1st CMC joint. No tenderness of the A1 pulley of the thumb.   Motion: Elbow: normal bilateral symmetric ext/flex  Wrist: normal bilateral symmetric ext/flex/sup/pro  Fingers: able to make a full fist   Neurovascular Normal perfused hand with brisk capillary refill in all digits. Sensation is intact to light touch in the median, ulnar, and radial sensory nerve distribution.  Motor function is intact to AIN, PIN, and ulnar motor nerve.     Special Tests:  Negative Finkelstein's test today.  Wrist extension and flexion is symmetrically normal.      Diagnostic Studies:    Results  RADIOLOGY      I reviewed the images independently from the professional interpretation, and discussed my interpretation of the pertinent findings with patient/family.    4/2/2025 xrays of Left wrist: On my independent review of the radiographs, there is no acute bony abnormality noted.     Assessment/Plan:  34 year old female with Left radial-sided wrist swelling.    I reviewed the patient's electronic medical record, including the pertinent office notes, medical/surgical history, medications and images. I specifically reviewed the images noted above, independently and discussed my independent interpretation of these images in combination with the pertinent positive and negative findings in my clinical exam with the patient.    Today I discussed with the patient the pathophysiology and pathoanatomy of DeQuervain's tenosynovitis as well as the treatment options. Non-operative modalities include continued rest, NSAIDs, and immobilization in thumb spica braces/splints. Indications for procedural treatments including corticosteroid injections were discussed, as were indications for surgical treatment. I reviewed the risks, alternatives, and expected outcomes of these treatment options. Education and counseling was given for the above diagnosis.    Assessment &  Plan  De Quervain's tenosynovitis  Chronic De Quervain's tenosynovitis in the left wrist, likely exacerbated by postpartum activities. Initial treatment with therapy and a wrist brace/band provided significant relief. Current symptoms include persistent swelling at the site of previous tendonitis, with no significant pain. This is likely residual swelling from tendonitis rather than a cyst or mass. The swelling is expected to resolve over time, and intervention is not recommended unless symptoms worsen.  - Advise monitoring the swelling for changes in size or symptoms.  - Recommend icing the area to manage swelling.  - Ibuprofen use if pain accompanies the swelling.  - Instruct to return for evaluation if the swelling increases in size or if pain develops.  - Offer to order an MRI if the are of swelling/mass grows or if there are concerns about its nature.  - Schedule a follow-up appointment in a few months if desired, with the option to cancel if symptoms improve.      Follow Up: 6-8 weeks should symptoms not resolve fully    Jose Daniel Heller MD   Hand, Wrist, & Elbow Surgery  [email or web address]         [1] No Known Allergies  [2]   Past Medical History:   HPV in female    Obesity    PCOS (polycystic ovarian syndrome)   [3]   Past Surgical History:  Procedure Laterality Date    Hc cervical cerclage  02/01/2024   [4]   Current Outpatient Medications   Medication Sig Dispense Refill    ergocalciferol 1.25 MG (96857 UT) Oral Cap Take 1 capsule (50,000 Units total) by mouth once a week. 24 capsule 0    metFORMIN  MG Oral Tablet 24 Hr Take 1 tablet (500 mg total) by mouth 2 (two) times daily with meals. 180 tablet 3    APPLE CIDER VINEGAR OR Take by mouth.      methocarbamol 750 MG Oral Tab Take 1 tablet (750 mg total) by mouth 3 (three) times daily as needed. (Patient not taking: Reported on 5/1/2025) 8 tablet 0    acidophilus-pectin Oral Cap Take 1 capsule by mouth daily. (Patient not taking: Reported  on 5/1/2025)     [5]   Family History  Problem Relation Age of Onset    No Known Problems Father     High Cholesterol Mother     Asthma Maternal Grandmother     Cancer Maternal Grandmother         lung smoker    Dementia Maternal Grandmother     No Known Problems Maternal Grandfather     Other (altzhirmer) Paternal Grandmother     Dementia Paternal Grandmother     Mental Disorder Sister     Other (Multiple sclerosis) Sister 22    No Known Problems Brother     Breast Cancer Neg     Colon Cancer Neg     Ovarian Cancer Neg

## 2025-07-15 ENCOUNTER — OFFICE VISIT (OUTPATIENT)
Dept: OBGYN CLINIC | Facility: CLINIC | Age: 35
End: 2025-07-15
Payer: COMMERCIAL

## 2025-07-15 ENCOUNTER — LAB ENCOUNTER (OUTPATIENT)
Dept: LAB | Age: 35
End: 2025-07-15
Attending: OBSTETRICS & GYNECOLOGY
Payer: COMMERCIAL

## 2025-07-15 VITALS
WEIGHT: 196.81 LBS | HEIGHT: 66 IN | SYSTOLIC BLOOD PRESSURE: 118 MMHG | DIASTOLIC BLOOD PRESSURE: 70 MMHG | BODY MASS INDEX: 31.63 KG/M2

## 2025-07-15 DIAGNOSIS — N92.6 IRREGULAR MENSES: ICD-10-CM

## 2025-07-15 DIAGNOSIS — Z01.419 ENCOUNTER FOR GYNECOLOGICAL EXAMINATION WITHOUT ABNORMAL FINDING: Primary | ICD-10-CM

## 2025-07-15 LAB
CORTIS SERPL-MCNC: 11.4 UG/DL
FSH SERPL-ACNC: 7 MIU/ML
LH SERPL-ACNC: 10.1 MIU/ML
PROLACTIN SERPL-MCNC: 4 NG/ML
TSI SER-ACNC: 0.72 UIU/ML (ref 0.55–4.78)

## 2025-07-15 PROCEDURE — 84443 ASSAY THYROID STIM HORMONE: CPT | Performed by: OBSTETRICS & GYNECOLOGY

## 2025-07-15 PROCEDURE — 3008F BODY MASS INDEX DOCD: CPT | Performed by: OBSTETRICS & GYNECOLOGY

## 2025-07-15 PROCEDURE — 99214 OFFICE O/P EST MOD 30 MIN: CPT | Performed by: OBSTETRICS & GYNECOLOGY

## 2025-07-15 PROCEDURE — 3074F SYST BP LT 130 MM HG: CPT | Performed by: OBSTETRICS & GYNECOLOGY

## 2025-07-15 PROCEDURE — 84146 ASSAY OF PROLACTIN: CPT | Performed by: OBSTETRICS & GYNECOLOGY

## 2025-07-15 PROCEDURE — 87624 HPV HI-RISK TYP POOLED RSLT: CPT | Performed by: OBSTETRICS & GYNECOLOGY

## 2025-07-15 PROCEDURE — 83520 IMMUNOASSAY QUANT NOS NONAB: CPT | Performed by: OBSTETRICS & GYNECOLOGY

## 2025-07-15 PROCEDURE — 99395 PREV VISIT EST AGE 18-39: CPT | Performed by: OBSTETRICS & GYNECOLOGY

## 2025-07-15 PROCEDURE — 83002 ASSAY OF GONADOTROPIN (LH): CPT | Performed by: OBSTETRICS & GYNECOLOGY

## 2025-07-15 PROCEDURE — 83001 ASSAY OF GONADOTROPIN (FSH): CPT | Performed by: OBSTETRICS & GYNECOLOGY

## 2025-07-15 PROCEDURE — 82533 TOTAL CORTISOL: CPT | Performed by: OBSTETRICS & GYNECOLOGY

## 2025-07-15 PROCEDURE — 3078F DIAST BP <80 MM HG: CPT | Performed by: OBSTETRICS & GYNECOLOGY

## 2025-07-15 NOTE — PROGRESS NOTES
CC: Patient is here for irregular menses.     HPI: Patient is a 34 year old  for concerns about hormones.     Baby boy age 1.   Menses: stopped breast feeding at 6 M and did not have period until now. She has had difficulty losing weight, Started working out daily. Concerned that her hormones were off. Started inositol. [place], metformin and period returned 2 W ago. She would like to get pregnant soon, but worried about weight. Had spontaneous conception. Tried low carb diet, increasing her exercise and has lost 13 lbs in the last month. She has known PCO    Patient's last menstrual period was 2025.      Depression Screening (PHQ-2/PHQ-9): Over the LAST 2 WEEKS   Little interest or pleasure in doing things (over the last two weeks)?: Not at all    Feeling down, depressed, or hopeless (over the last two weeks)?: Not at all    PHQ-2 SCORE: 0        LReview of Systems:    Pertinent positive and negatives reviewed in HPI    CONSTITUTIONAL:  No weight loss, no fever, chills, no weakness, no fatigue. Difficulty losing weight.   SKIN:  No rash, no  itching. No new facial or body hair, no new acne. New chin hair  GASTROINTESTINAL:  No anorexia, no nausea, no vomiting, no diarrhea, no abdominal pain, no blood in stool or blackened stools, no constipation, no bloating  GENITOURINARY:  No burning on urination. No urinary frequency.  No blood in urine. No urinary incontinence  MUSCULOSKELETAL:  No muscle pain, no back pain, no joint pain, no stiffness.  HEMATOLOGIC:  No history of anemia, no excessive bleeding, no excessive bruising.  LYMPHATICS:  No enlarged nodes. No history of splenectomy.  PSYCHIATRIC:  No history of depression, no history of anxiety.  ENDOCRINOLOGIC:  No reports of sweating, no cold intolerance, no heat intolerance, no excessive urination, no excessive thirst  ALLERGIES:  No history of asthma, no hives, no eczema, no rhinitis.         OB History    Para Term  AB Living   1 1  1 0 0 1   SAB IAB Ectopic Multiple Live Births   0 0 0 0 1      # Outcome Date GA Lbr Dany/2nd Weight Sex Type Anes PTL Lv   1 Term 06/10/24 39w6d 14:00 / 02:45 9 lb 6.6 oz (4.27 kg) M NORMAL SPONT EPI N CARLOS      Complications: Group B beta strep +, Late decelerations, Variable decelerations       GYN hx:    Hx Prior Abnormal Pap: Yes (+hpv, colpo)  Pap Date: 11/18/21  Pap Result Notes: WNL  Follow Up Recommendation: Colposcopy yrs ago  LPS:      Past Medical History[1]  Past Surgical History[2]  Allergies[3]  Family History[4]  Social History     Socioeconomic History    Marital status:      Spouse name: Not on file    Number of children: Not on file    Years of education: Not on file    Highest education level: Not on file   Occupational History    Occupation: manager   Tobacco Use    Smoking status: Never     Passive exposure: Never    Smokeless tobacco: Never   Vaping Use    Vaping status: Never Used   Substance and Sexual Activity    Alcohol use: Yes    Drug use: Never    Sexual activity: Yes     Partners: Male   Other Topics Concern     Service Not Asked    Blood Transfusions No    Caffeine Concern No    Occupational Exposure Not Asked    Hobby Hazards Not Asked    Sleep Concern Not Asked    Stress Concern No    Weight Concern No    Special Diet No    Back Care Not Asked    Exercise Yes    Bike Helmet Not Asked    Seat Belt Yes    Self-Exams Not Asked    Grew up on a farm Not Asked    History of tanning Yes    Outdoor occupation Not Asked    Breast feeding Not Asked    Reaction to local anesthetic No   Social History Narrative    Live with  and child    Feels safe     Social Drivers of Health     Food Insecurity: No Food Insecurity (4/1/2025)    NCSS - Food Insecurity     Worried About Running Out of Food in the Last Year: No     Ran Out of Food in the Last Year: No   Transportation Needs: No Transportation Needs (4/1/2025)    NCSS - Transportation     Lack of Transportation: No   Stress:  No Stress Concern Present (6/10/2024)    Stress     Feeling of Stress : No   Housing Stability: Not At Risk (4/1/2025)    NCSS - Housing/Utilities     Has Housing: Yes     Worried About Losing Housing: No     Unable to Get Utilities: No       Medications reviewed. See active list.     /70   Ht 66\"   Wt 196 lb 12.8 oz (89.3 kg)   LMP 07/01/2025   Breastfeeding No   BMI 31.76 kg/m²       Exam:   GENERAL: well developed, well nourished, in no apparent distress  ABDOMEN: Soft, non distended; non tender, no masses.  Liver and spleen non-tender, no enlargement. No palpable hernias  GYNE/:  External Genitalia: Normal appearing, no lesions, normal hair distribution   Urethral meatus appear wnl, no abnormal discharge or lesions noted.   Bladder: well supported, urethra wnl, no palpable tenderness or masses, no discharge  Vagina: normal pink mucosa, no lesions, normal clear discharge.   Uterus: midline, mobile, non-tender, firm and smooth  Cervix: pink, no lesions grossly visible, no discharge  Adnexa: non tender, no palpable masses, normal size  Anus:  No lesions or visible hemorrhoids        A/P: Patient is 34 year old female     1. Irregular menses  - LH (Luteinizing Hormone); Future  - FSH; Future  - Prolactin  - TSH W Reflex To Free T4; Future  - CORTISOL [367] [Q]  - Anti-Müllerian Hormone (AMH) ([employer]); Future    2. Encounter for gynecological examination without abnormal finding    Pap sent  Lorena Palmer MD                [1]   Past Medical History:   HPV in female    Obesity    PCOS (polycystic ovarian syndrome)   [2]   Past Surgical History:  Procedure Laterality Date    Hc cervical cerclage  02/01/2024   [3] No Known Allergies  [4]   Family History  Problem Relation Age of Onset    No Known Problems Father     High Cholesterol Mother     Asthma Maternal Grandmother     Cancer Maternal Grandmother         lung smoker    Dementia Maternal Grandmother     No Known Problems  Maternal Grandfather     Other (altzhirmer) Paternal Grandmother     Dementia Paternal Grandmother     Mental Disorder Sister     Other (Multiple sclerosis) Sister 22    No Known Problems Brother     Breast Cancer Neg     Colon Cancer Neg     Ovarian Cancer Neg

## 2025-07-16 LAB — HPV E6+E7 MRNA CVX QL NAA+PROBE: NEGATIVE

## 2025-07-19 LAB — MULLERIAN AMH: 7.16 NG/ML

## (undated) NOTE — MR AVS SNAPSHOT
After Visit Summary   11/2/2020    Di Layne    MRN: [id number]           Visit Information     Date & Time  11/2/2020  9:30 AM Provider  Gretchen Palmer MD [address] [facility] Dept.  P If you receive a survey from [employer], please take a few minutes to complete it and provide feedback. We strive to deliver the best patient experience and are looking for ways to make improvements. Your feedback will help us do so.  For more information EMERGENCY ROOM Life-threatening emergencies needing immediate intervention at a hospital emergency room.  Average cost  $2,300*   *Cost varies based on your insurance coverage  For more information about hours, locations or appointment options available at

## (undated) NOTE — LETTER
24          RE:  Martina Sheikh                : 1990      To Whom It May Concern:    This letter is to inform you that Martina Sheikh is under our care for her pregnancy. Due to a pregnancy related condition it is recommended for her not to work until 24. This date maybe updated pending her condition.     If you have any questions concerning this letter, please do not hesitate to contact my office.    Sincerely yours,         Nilay Lopez MD

## (undated) NOTE — LETTER
February 8, 2024        Re: Martina Sheikh          To Whom It May Concern:    I have seen and evaluated my patient Martina Sheikh today who underwent a surgery due to a pregnancy complication last week.   Expected date of delivery is June 11, 2024.   I have cleared her to return to work but she may not lift or carry anything that is 20 pounds or greater.  I have also advised her to be cognizant to take the mandated breaks and to let me know if she develops any additional complications.    Please do not hesitate to contact me if additional information is needed.  Thank you for helping to accommodate these minimal restrictions that I am recommending to help ensure healthy pregnancy.    Best Regards,          Mya Alejandra MD, FACOG  Maternal-Fetal Medicine  [phone]

## (undated) NOTE — LETTER
Martina Sheikh, :1990    CONSENT FOR PROCEDURE/SEDATION    1. I authorize the performance upon Martina Sheikh  the following: Cerclage removal    2. I authorize Dr. Nilay Lopez MD (and whomever is designated as the doctor’s assistant), to perform the above-mentioned procedures.    3. If any unforeseen conditions arise during this procedure calling for additional  procedures, operations, or medications (including anesthesia and blood transfusion), I further request and authorize the doctor to do whatever he/she deems advisable in my interest.    4. I consent to the taking and reproduction of any photographs in the course of this procedure for professional purposes.    5. I consent to the administration of such sedation as may be considered necessary or advisable by the physician responsible for this service, with the exception of ______________________________________________________    6. I have been informed by my doctor of the nature and purpose of this procedure sedation, possible alternative methods of treatment, risk involved and possible complications.    Signature of Patient:_______________________________________________    Signature of person authorized to consent for patient:  _______________________________________________________________    Relationship to patient: ____________________________________________    Witness: _________________________________________ Date:___________     Physician Signature: _______________________________ Date:___________

## (undated) NOTE — LETTER
05/08/24    To Whom It May Concern,     Martina Sheikh is under our care for her pregnancy. She has a pregnancy associated medical condition that is improving but still requires some work adjustments. She can return to work with a limit of 20 hours per week.     Sincerely,          Dr. Nilay Lopez MD    [facility]

## (undated) NOTE — LETTER
02/28/24    To Whom It May Concern,     Martina Sheikh is currently under our care for her pregnancy. She has a pregnancy related health condition for which it is not recommended for her to work at this time. She may return to work on 3/25/24 should her health condition not change.     Sincerely,        Dr. Nilay Lopez MD    [facility]

## (undated) NOTE — Clinical Note
Started vaginal progesterone and follow-up cervical length in 1 week.  Tentatively scheduled for a cerclage if it is needed on that day.

## (undated) NOTE — LETTER
[facility]  Administration of Anesthesia  Martina TOM agree to be cared for by a physician anesthesiologist alone and/or with a nurse anesthetist, who is specially trained to monitor me and give me medicine to put me to sleep or keep me comfortable during my procedure    I understand that my anesthesiologist and/or anesthetist is not an employee or agent of [facility] or [employer]. He or she works for [facility]    As the patient asking for anesthesia services, I agree to:  Allow the anesthesiologist (anesthesia doctor) to give me medicine and do additional procedures as necessary. Some examples are: Starting or using an “IV” to give me medicine, fluids or blood during my procedure, and having a breathing tube placed to help me breathe when I’m asleep (intubation). In the event that my heart stops working properly, I understand that my anesthesiologist will make every effort to sustain my life, unless otherwise directed by [facility] Do Not Resuscitate documents.  Tell my anesthesia doctor before my procedure:  If I am pregnant.  The last time that I ate or drank.  iii. All of the medicines I take (including prescriptions, herbal supplements, and pills I can buy without a prescription (including street drugs/illegal medications). Failure to inform my anesthesiologist about these medicines may increase my risk of anesthetic complications.  iv.If I am allergic to anything or have had a reaction to anesthesia before.  I understand how the anesthesia medicine will help me (benefits).  I understand that with any type of anesthesia medicine there are risks:  The most common risks are: nausea, vomiting, sore throat, muscle soreness, damage to my eyes, mouth, or teeth (from breathing tube placement).  Rare risks include: remembering what happened during my procedure, allergic reactions to medications, injury to my airway, heart, lungs, vision, nerves, or  muscles and in extremely rare instances death.  My doctor has explained to me other choices available to me for my care (alternatives).  Pregnant Patients (“epidural”):  I understand that the risks of having an epidural (medicine given into my back to help control pain during labor), include itching, low blood pressure, difficulty urinating, headache or slowing of the baby’s heart. Very rare risks include infection, bleeding, seizure, irregular heart rhythms and nerve injury.  Regional Anesthesia (“spinal”, “epidural”, & “nerve blocks”):  I understand that rare but potential complications include headache, bleeding, infection, seizure, irregular heart rhythms, and nerve injury.    _____________________________________________________________________________  Patient (or Representative) Signature/Relationship to Patient  Date   Time    _____________________________________________________________________________   Name (if used)    Language/Organization   Time    _____________________________________________________________________________  Nurse Anesthetist Signature     Date   Time  _____________________________________________________________________________  Anesthesiologist Signature     Date   Time  I have discussed the procedure and information above with the patient (or patient’s representative) and answered their questions. The patient or their representative has agreed to have anesthesia services.    _____________________________________________________________________________  Witness        Date   Time  I have verified that the signature is that of the patient or patient’s representative, and that it was signed before the procedure  Patient Name: Martina Sheikh     : 1990                 Printed: 6/10/2024 at 5:56 AM    Medical Record #: [id number]                                            Page 1 of 1  ----------ANESTHESIA CONSENT----------

## (undated) NOTE — MR AVS SNAPSHOT
After Visit Summary   11/18/2021    Marlen Owen   MRN: [id number]           Visit Information     Date & Time  11/18/2021 12:00 PM Provider  Shelia Palmer MD [address] [facility] Dept. allergies, back pain and cold symptoms? Skip the drive and waiting room and online chat with a doctor face-to-face using your web-cam enabled computer or mobile device wherever you are.  Video Visits cost $50 and can be paid hassle-free using a credit, [email or web address]